# Patient Record
Sex: FEMALE | Race: WHITE | NOT HISPANIC OR LATINO | Employment: OTHER | URBAN - METROPOLITAN AREA
[De-identification: names, ages, dates, MRNs, and addresses within clinical notes are randomized per-mention and may not be internally consistent; named-entity substitution may affect disease eponyms.]

---

## 2021-04-28 ENCOUNTER — TRANSFERRED RECORDS (OUTPATIENT)
Dept: HEALTH INFORMATION MANAGEMENT | Facility: CLINIC | Age: 81
End: 2021-04-28

## 2021-04-29 ENCOUNTER — TRANSFERRED RECORDS (OUTPATIENT)
Dept: HEALTH INFORMATION MANAGEMENT | Facility: CLINIC | Age: 81
End: 2021-04-29

## 2021-05-05 ENCOUNTER — MEDICAL CORRESPONDENCE (OUTPATIENT)
Dept: HEALTH INFORMATION MANAGEMENT | Facility: CLINIC | Age: 81
End: 2021-05-05

## 2021-05-05 ENCOUNTER — TRANSFERRED RECORDS (OUTPATIENT)
Dept: HEALTH INFORMATION MANAGEMENT | Facility: CLINIC | Age: 81
End: 2021-05-05

## 2021-05-05 ENCOUNTER — TELEPHONE (OUTPATIENT)
Dept: ORTHOPEDICS | Facility: CLINIC | Age: 81
End: 2021-05-05

## 2021-05-05 NOTE — TELEPHONE ENCOUNTER
Health Call Center    Phone Message    May a detailed message be left on voicemail: yes     Reason for Call: Other: Dr Darien Alvarado office calling to refer patient Yuridia Galvan to Dr Schmitz. Mass Right Hand 4.1 cm RL/3.7cm CC/1.5 cm AD/ they will have [facility] push the CT scan and they will also fax over notes to the fax of [phone] Please call patient to set consult. [phone]      Action Taken: Message routed to:  [facility] ([facility] Ortho    Travel Screening: Not Applicable

## 2021-05-05 NOTE — TELEPHONE ENCOUNTER
LVM offering to schedule an appt with Dr Schmitz. Offered appt with Dr Chavez or Dr Casillas to get pt seen sooner. Encouraged pt to call back but writer will call pt again to get her scheduled.     Alpa Trujillo ATC

## 2021-05-05 NOTE — TELEPHONE ENCOUNTER
Called and spoke to pt. Scheduled pt for an in person appt. Pt declined a virtual visit at a sooner date. Pt requested an appointment closer to home and didn't want to come into [facility] for their appt.     Scheduled appt with Dr Chavez. Gave pt address into the [facility]. Pt confirmed appt date/time/location and gave parking options. All questions answered.     Alpa Trujillo ATC

## 2021-05-06 NOTE — TELEPHONE ENCOUNTER
RECORDS RECEIVED FROM: Right Hand Mass // referred by Darien Alvarado    DATE RECEIVED: May 7, 2021     NOTES STATUS DETAILS   OFFICE NOTE from referring provider Internal Darien Gooden DO   OFFICE NOTE from other specialist N/A    DISCHARGE SUMMARY from hospital N/A    DISCHARGE REPORT from the ER N/A    OPERATIVE REPORT N/A    MEDICATION LIST Internal    IMPLANT RECORD/STICKER N/A    LABS     CBC/DIFF N/A    CULTURES N/A    INJECTIONS DONE IN RADIOLOGY N/A    MRI N/A    CT SCAN Internal    XRAYS (IMAGES & REPORTS) N/A    TUMOR     PATHOLOGY  Slides & report N/A    05/06/21   9:08 AM   COMPLETE  Cecily Calixto, CMA

## 2021-05-07 ENCOUNTER — OFFICE VISIT (OUTPATIENT)
Dept: ORTHOPEDICS | Facility: CLINIC | Age: 81
End: 2021-05-07
Payer: COMMERCIAL

## 2021-05-07 ENCOUNTER — PRE VISIT (OUTPATIENT)
Dept: ORTHOPEDICS | Facility: CLINIC | Age: 81
End: 2021-05-07

## 2021-05-07 VITALS — WEIGHT: 165 LBS | HEIGHT: 67 IN | BODY MASS INDEX: 25.9 KG/M2

## 2021-05-07 DIAGNOSIS — M79.89 SOFT TISSUE MASS: Primary | ICD-10-CM

## 2021-05-07 PROCEDURE — 99204 OFFICE O/P NEW MOD 45 MIN: CPT | Mod: GC | Performed by: ORTHOPAEDIC SURGERY

## 2021-05-07 RX ORDER — BIMATOPROST 0.3 MG/ML
1 SOLUTION/ DROPS OPHTHALMIC AT BEDTIME
COMMUNITY
End: 2021-05-19

## 2021-05-07 RX ORDER — AMLODIPINE BESYLATE 5 MG/1
5 TABLET ORAL EVERY MORNING
COMMUNITY
Start: 2020-12-11

## 2021-05-07 RX ORDER — ATROPINE SULFATE 10 MG/ML
SOLUTION/ DROPS OPHTHALMIC
COMMUNITY
End: 2021-05-19

## 2021-05-07 RX ORDER — ACETAZOLAMIDE 500 MG/1
500 CAPSULE, EXTENDED RELEASE ORAL 2 TIMES DAILY
COMMUNITY

## 2021-05-07 RX ORDER — LOVASTATIN 20 MG
20 TABLET ORAL AT BEDTIME
COMMUNITY
Start: 2020-12-11

## 2021-05-07 RX ORDER — METOPROLOL SUCCINATE 50 MG/1
50 TABLET, EXTENDED RELEASE ORAL EVERY MORNING
COMMUNITY
Start: 2021-05-03

## 2021-05-07 RX ORDER — ACETAMINOPHEN 500 MG
1000 TABLET ORAL EVERY 6 HOURS PRN
COMMUNITY

## 2021-05-07 RX ORDER — LOSARTAN POTASSIUM 100 MG/1
100 TABLET ORAL EVERY MORNING
COMMUNITY
Start: 2020-12-11

## 2021-05-07 RX ORDER — LEVOTHYROXINE SODIUM 100 UG/1
100 TABLET ORAL EVERY MORNING
COMMUNITY
Start: 2020-12-12

## 2021-05-07 RX ORDER — POLYETHYLENE GLYCOL 3350
POWDER (GRAM) MISCELLANEOUS
COMMUNITY
End: 2021-05-19

## 2021-05-07 RX ORDER — AMOXICILLIN 250 MG
1 CAPSULE ORAL PRN
Status: ON HOLD | COMMUNITY
End: 2021-05-25

## 2021-05-07 RX ORDER — TIMOLOL MALEATE 5 MG/ML
1 SOLUTION/ DROPS OPHTHALMIC EVERY EVENING
COMMUNITY

## 2021-05-07 RX ORDER — AMOXICILLIN 500 MG/1
CAPSULE ORAL
COMMUNITY
End: 2021-05-19

## 2021-05-07 RX ORDER — DORZOLAMIDE HCL 20 MG/ML
1 SOLUTION/ DROPS OPHTHALMIC 2 TIMES DAILY
COMMUNITY

## 2021-05-07 RX ORDER — BIMATOPROST 0.3 MG/ML
SOLUTION/ DROPS OPHTHALMIC
COMMUNITY
End: 2021-05-19

## 2021-05-07 ASSESSMENT — MIFFLIN-ST. JEOR: SCORE: 1253.68

## 2021-05-07 NOTE — LETTER
Date:May 10, 2021      Patient was self referred, no letter generated. Do not send.        [facility] Health Information

## 2021-05-07 NOTE — LETTER
5/7/2021         RE: Yuridia Galvan  [id number] [facility] [address]        Dear Colleague,    Thank you for referring your patient, Yuridia Galvan, to the [facility] [place]. Please see a copy of my visit note below.    I was present with the resident during the history and exam.  I discussed the case with the resident and agree with the findings as documented in the assessment and plan.        [facility], Orthopaedic Oncology Surgery Consultation    Yuridia Galvan MRN# [id number]   Age: 80 year old YOB: 1940     Requesting physician: No ref. provider found  No primary care provider on file.            Assessment and Plan:   Assessment:  80-year-old right-hand-dominant female with chronic painful right hypothenar mass.  Differential includes hemangioma, neurofibroma or nerve sheath tumor.     Plan:  -Will attempt to obtain MRI to further evaluate the mass and its relationship to the adjacent neurovascular structures.  Patient has an implantable cardiac event monitor, however this appears to be MRI compatible based on the device information card.  -Discussed treatment options including continued observation versus mass excision.  Given the mass has been exquisitely painful and limiting the patient's ability to participate in activities, the patient is interested in mass excision.  Although it is likely the mass could be excised without neurovascular damage, discussed the risks of surgery which include but are not limited to infection, blood loss, neurovascular injury, temporary or permanent pain/paresthesias/numbness, and incomplete excision of the mass/recurrence.  After this discussion the patient elected to proceed with surgery.           History of Present Illness:   80 year old RHD female with a history of a right hypothenar mass since 1999.  She had a mass evaluated in 1999 but was told there were no surgical treatment options given the proximity of  "the mass to the neurovascular structures in her hand.  Over the years the mass has been stable to slowly enlarging however there was a noticeable increase in the size beginning approximately 1 year ago.  She also began experiencing severe hypothenar pain with the increase in size, however her pain has significantly improved in the past month.  Pain is not present at rest but does occur with direct pressure.  Pain has prevented her from participating in activities she wishes to do such as gardening.  She also endorses numbness and paresthesias over the palmar aspect of her index through small fingers, most notably in the small finger.  Endorses previous transient skin discoloration over the mass; previously a bluish hue, transiently erythematous.  She does report she was diagnosed with cellulitis overlying the mass in March 2021 that was treated with antibiotics and has since resolved.  Her  notes that the mass has intermittently changed in size and density.  No known injury or surgery to right hand.  Denies fevers, chills, nausea, vomiting, night sweats, or unintentional weight loss.    Previous work-up has included a CT scan.  She does have a implanted cardiac event monitor which was felt to be a contraindication to an MRI at an outside facility.           Physical Exam:     EXAMINATION pertinent findings:   PSYCH: Pleasant, healthy-appearing, alert, oriented x3, cooperative. Normal mood and affect.  VITAL SIGNS: Height 1.69 m (5' 6.54\"), weight 74.8 kg (165 lb)..  Reviewed nursing intake notes.   Body mass index is 26.21 kg/m .  RESP: non labored breathing   ABD: benign, soft, non-tender, no acute peritoneal findings  SKIN: grossly normal   LYMPHATIC: grossly normal, no adenopathy, no extremity edema  NEURO: grossly normal , no motor deficits  VASCULAR: satisfactory perfusion of all extremities   MUSCULOSKELETAL:   -Skin intact throughout.  No rashes or erythema. Visible swelling of the hypothenar eminence. "  No appreciable atrophy in the first webspace  -Palpable mass over the volar hypothenar eminence, approximately 5 cm in length and 4 cm in width.  Mildly tender to palpation about the mass  -Negative Tinel's over the carpal tunnel and Guyon's canal  -Full symmetric range of motion with the wrist, MCP, PIP, and DIP joints  -5/5 strength with wrist extension, wrist flexion, handgrip, finger extension, EPL, FPL, interossei  -Subjectively decreased but intact sensation in the low ulnar nerve distribution.  Sensation intact to light touch throughout the median, radial, and high ulnar nerve distribution  -2+ radial pulse.  All fingers are warm and well-perfused  -Jaswant's test is notable for brisk reperfusion with both the radial and ulnar arteries             Data:   All laboratory data reviewed  All imaging studies reviewed by me    CT scan from outside hospital 4/28/21        DATA for DOCUMENTATION:         Past Medical History:   There is no problem list on file for this patient.    No past medical history on file.    Also see scanned health assessment forms.       Past Surgical History:   No past surgical history on file.         Social History:     Social History     Socioeconomic History     Marital status:      Spouse name: Not on file     Number of children: Not on file     Years of education: Not on file     Highest education level: Not on file   Occupational History     Not on file   Social Needs     Financial resource strain: Not on file     Food insecurity     Worry: Not on file     Inability: Not on file     Transportation needs     Medical: Not on file     Non-medical: Not on file   Tobacco Use     Smoking status: Not on file   Substance and Sexual Activity     Alcohol use: Not on file     Drug use: Not on file     Sexual activity: Not on file   Lifestyle     Physical activity     Days per week: Not on file     Minutes per session: Not on file     Stress: Not on file   Relationships     Social  connections     Talks on phone: Not on file     Gets together: Not on file     Attends Anabaptist service: Not on file     Active member of club or organization: Not on file     Attends meetings of clubs or organizations: Not on file     Relationship status: Not on file     Intimate partner violence     Fear of current or ex partner: Not on file     Emotionally abused: Not on file     Physically abused: Not on file     Forced sexual activity: Not on file   Other Topics Concern     Not on file   Social History Narrative     Not on file            Family History:     No family history on file.         Medications:     Current Outpatient Medications   Medication Sig     acetaminophen (TYLENOL) 500 MG tablet Take 1,000 mg by mouth     acetaZOLAMIDE (DIAMOX SEQUEL) 500 MG 12 hr capsule acetazolamide  mg capsule,extended release     amLODIPine (NORVASC) 5 MG tablet amlodipine 5 mg tablet     amoxicillin (AMOXIL) 500 MG capsule amoxicillin 500 mg capsule     atropine 1 % ophthalmic solution atropine 1 % eye drops     bimatoprost (LUMIGAN) 0.03 % ophthalmic solution bimatoprost 0.03 % eye drops     bimatoprost (LUMIGAN) 0.03 % ophthalmic solution 1 drop At Bedtime     dorzolamide (TRUSOPT) 2 % ophthalmic solution dorzolamide 2 % eye drops     levothyroxine (SYNTHROID/LEVOTHROID) 100 MCG tablet levothyroxine 100 mcg tablet     losartan (COZAAR) 100 MG tablet losartan 100 mg tablet     lovastatin (MEVACOR) 20 MG tablet lovastatin 20 mg tablet     metoprolol succinate ER (TOPROL-XL) 50 MG 24 hr tablet metoprolol succinate ER 50 mg tablet,extended release 24 hr     polyethylene glycol 3350 POWD      senna-docusate (SENOKOT-S/PERICOLACE) 8.6-50 MG tablet Take 1 tablet by mouth daily     timolol maleate (TIMOPTIC) 0.5 % ophthalmic solution timolol maleate 0.5 % eye drops     No current facility-administered medications for this visit.               Review of Systems:   A comprehensive 10 point review of systems  (constitutional, ENT, cardiac, peripheral vascular, lymphatic, respiratory, GI, , Musculoskeletal, skin, Neurological) was performed and found to be negative except as described in this note.     See intake form completed by patient    The patient was seen and examined with Dr. Chavez.    Shekhar Max MD  PGY-4, Orthopaedic Surgery  Pager: [phone]        Again, thank you for allowing me to participate in the care of your patient.        Sincerely,        Braydon Chavez MD

## 2021-05-07 NOTE — PROGRESS NOTES
[facility], Orthopaedic Oncology Surgery Consultation    Yuridia Galvan MRN# [id number]   Age: 80 year old YOB: 1940     Requesting physician: No ref. provider found  No primary care provider on file.            Assessment and Plan:   Assessment:  80-year-old right-hand-dominant female with chronic painful right hypothenar mass.  Differential includes hemangioma, neurofibroma or nerve sheath tumor.     Plan:  -Will attempt to obtain MRI to further evaluate the mass and its relationship to the adjacent neurovascular structures.  Patient has an implantable cardiac event monitor, however this appears to be MRI compatible based on the device information card.  -Discussed treatment options including continued observation versus mass excision.  Given the mass has been exquisitely painful and limiting the patient's ability to participate in activities, the patient is interested in mass excision.  Although it is likely the mass could be excised without neurovascular damage, discussed the risks of surgery which include but are not limited to infection, blood loss, neurovascular injury, temporary or permanent pain/paresthesias/numbness, and incomplete excision of the mass/recurrence.  After this discussion the patient elected to proceed with surgery.           History of Present Illness:   80 year old RHD female with a history of a right hypothenar mass since 1999.  She had a mass evaluated in 1999 but was told there were no surgical treatment options given the proximity of the mass to the neurovascular structures in her hand.  Over the years the mass has been stable to slowly enlarging however there was a noticeable increase in the size beginning approximately 1 year ago.  She also began experiencing severe hypothenar pain with the increase in size, however her pain has significantly improved in the past month.  Pain is not present at rest but does occur with direct pressure.  Pain has prevented her  "from participating in activities she wishes to do such as gardening.  She also endorses numbness and paresthesias over the palmar aspect of her index through small fingers, most notably in the small finger.  Endorses previous transient skin discoloration over the mass; previously a bluish hue, transiently erythematous.  She does report she was diagnosed with cellulitis overlying the mass in March 2021 that was treated with antibiotics and has since resolved.  Her  notes that the mass has intermittently changed in size and density.  No known injury or surgery to right hand.  Denies fevers, chills, nausea, vomiting, night sweats, or unintentional weight loss.    Previous work-up has included a CT scan.  She does have a implanted cardiac event monitor which was felt to be a contraindication to an MRI at an outside facility.           Physical Exam:     EXAMINATION pertinent findings:   PSYCH: Pleasant, healthy-appearing, alert, oriented x3, cooperative. Normal mood and affect.  VITAL SIGNS: Height 1.69 m (5' 6.54\"), weight 74.8 kg (165 lb)..  Reviewed nursing intake notes.   Body mass index is 26.21 kg/m .  RESP: non labored breathing   ABD: benign, soft, non-tender, no acute peritoneal findings  SKIN: grossly normal   LYMPHATIC: grossly normal, no adenopathy, no extremity edema  NEURO: grossly normal , no motor deficits  VASCULAR: satisfactory perfusion of all extremities   MUSCULOSKELETAL:   -Skin intact throughout.  No rashes or erythema. Visible swelling of the hypothenar eminence.  No appreciable atrophy in the first webspace  -Palpable mass over the volar hypothenar eminence, approximately 5 cm in length and 4 cm in width.  Mildly tender to palpation about the mass  -Negative Tinel's over the carpal tunnel and Guyon's canal  -Full symmetric range of motion with the wrist, MCP, PIP, and DIP joints  -5/5 strength with wrist extension, wrist flexion, handgrip, finger extension, EPL, FPL, " interossei  -Subjectively decreased but intact sensation in the low ulnar nerve distribution.  Sensation intact to light touch throughout the median, radial, and high ulnar nerve distribution  -2+ radial pulse.  All fingers are warm and well-perfused  -Jaswant's test is notable for brisk reperfusion with both the radial and ulnar arteries             Data:   All laboratory data reviewed  All imaging studies reviewed by me    CT scan from outside hospital 4/28/21        DATA for DOCUMENTATION:         Past Medical History:   There is no problem list on file for this patient.    No past medical history on file.    Also see scanned health assessment forms.       Past Surgical History:   No past surgical history on file.         Social History:     Social History     Socioeconomic History     Marital status:      Spouse name: Not on file     Number of children: Not on file     Years of education: Not on file     Highest education level: Not on file   Occupational History     Not on file   Social Needs     Financial resource strain: Not on file     Food insecurity     Worry: Not on file     Inability: Not on file     Transportation needs     Medical: Not on file     Non-medical: Not on file   Tobacco Use     Smoking status: Not on file   Substance and Sexual Activity     Alcohol use: Not on file     Drug use: Not on file     Sexual activity: Not on file   Lifestyle     Physical activity     Days per week: Not on file     Minutes per session: Not on file     Stress: Not on file   Relationships     Social connections     Talks on phone: Not on file     Gets together: Not on file     Attends Quaker service: Not on file     Active member of club or organization: Not on file     Attends meetings of clubs or organizations: Not on file     Relationship status: Not on file     Intimate partner violence     Fear of current or ex partner: Not on file     Emotionally abused: Not on file     Physically abused: Not on file      Forced sexual activity: Not on file   Other Topics Concern     Not on file   Social History Narrative     Not on file            Family History:     No family history on file.         Medications:     Current Outpatient Medications   Medication Sig     acetaminophen (TYLENOL) 500 MG tablet Take 1,000 mg by mouth     acetaZOLAMIDE (DIAMOX SEQUEL) 500 MG 12 hr capsule acetazolamide  mg capsule,extended release     amLODIPine (NORVASC) 5 MG tablet amlodipine 5 mg tablet     amoxicillin (AMOXIL) 500 MG capsule amoxicillin 500 mg capsule     atropine 1 % ophthalmic solution atropine 1 % eye drops     bimatoprost (LUMIGAN) 0.03 % ophthalmic solution bimatoprost 0.03 % eye drops     bimatoprost (LUMIGAN) 0.03 % ophthalmic solution 1 drop At Bedtime     dorzolamide (TRUSOPT) 2 % ophthalmic solution dorzolamide 2 % eye drops     levothyroxine (SYNTHROID/LEVOTHROID) 100 MCG tablet levothyroxine 100 mcg tablet     losartan (COZAAR) 100 MG tablet losartan 100 mg tablet     lovastatin (MEVACOR) 20 MG tablet lovastatin 20 mg tablet     metoprolol succinate ER (TOPROL-XL) 50 MG 24 hr tablet metoprolol succinate ER 50 mg tablet,extended release 24 hr     polyethylene glycol 3350 POWD      senna-docusate (SENOKOT-S/PERICOLACE) 8.6-50 MG tablet Take 1 tablet by mouth daily     timolol maleate (TIMOPTIC) 0.5 % ophthalmic solution timolol maleate 0.5 % eye drops     No current facility-administered medications for this visit.               Review of Systems:   A comprehensive 10 point review of systems (constitutional, ENT, cardiac, peripheral vascular, lymphatic, respiratory, GI, , Musculoskeletal, skin, Neurological) was performed and found to be negative except as described in this note.     See intake form completed by patient    The patient was seen and examined with Dr. Chavez.    Shekhar Max MD  PGY-4, Orthopaedic Surgery  Pager: [phone]

## 2021-05-07 NOTE — NURSING NOTE
"Reason For Visit:   Chief Complaint   Patient presents with     Consult     consulting right hand mass referred by Dr. Darien Gooden // noticed cyst back in 2000 // had covid vaccination in March 2021 and cyst got bigger per pt        Ht 1.69 m (5' 6.54\")   Wt 74.8 kg (165 lb)   BMI 26.21 kg/m      Pain Assessment  Patient Currently in Pain: Yes  0-10 Pain Scale: 2  Primary Pain Location: Hand(right hand)  Pain Descriptors: Numbness, Tingling    Leung Sarah, ATC    "

## 2021-05-12 ENCOUNTER — TELEPHONE (OUTPATIENT)
Dept: ORTHOPEDICS | Facility: CLINIC | Age: 81
End: 2021-05-12

## 2021-05-12 NOTE — TELEPHONE ENCOUNTER
RN called and spoke with Yuridia.  Her MRI is scheduled for  05-17-21.  Her [employer] device information is in her chart.

## 2021-05-14 ENCOUNTER — TELEPHONE (OUTPATIENT)
Dept: ORTHOPEDICS | Facility: CLINIC | Age: 81
End: 2021-05-14

## 2021-05-14 DIAGNOSIS — Z11.59 ENCOUNTER FOR SCREENING FOR OTHER VIRAL DISEASES: ICD-10-CM

## 2021-05-14 PROBLEM — M79.89 SOFT TISSUE MASS: Status: ACTIVE | Noted: 2021-05-14

## 2021-05-14 NOTE — TELEPHONE ENCOUNTER
RN called and left a voice message with Yuridia.  RN reviewed surgery packet information.  Her  will take care of her after surgery based on a conversation previously.  RN also mailed surgery packet to her and reviewed information below.      Patient is scheduled for surgery with Dr. Chavez     Spoke with: MINNIE Levin - RN scheduled with patient     Date of Surgery: 5/25/2021     Location: [facility]     Informed patient they will need an adult  yes     Pre op with Provider n/a     H&P: Scheduled with PAC on 5/19/2021 (video visit)     Pre-procedure COVID-19 Test: [facility] on 5/21/2021      Additional imaging/appointments: n/a     Surgery packet: Mailed by RN       Additional comments: n/a

## 2021-05-14 NOTE — TELEPHONE ENCOUNTER
Patient is scheduled for surgery with Dr. Chavez    Spoke with: Poonam RN - RN scheduled with patient    Date of Surgery: 5/25/2021    Location: [facility]    Informed patient they will need an adult  yes    Pre op with Provider n/a    H&P: Scheduled with PAC on 5/19/2021 (video visit)    Pre-procedure COVID-19 Test: [facility] on 5/21/2021     Additional imaging/appointments: n/a    Surgery packet: Mailed by RN      Additional comments: n/a

## 2021-05-17 ENCOUNTER — ANCILLARY PROCEDURE (OUTPATIENT)
Dept: MRI IMAGING | Facility: CLINIC | Age: 81
End: 2021-05-17
Attending: ORTHOPAEDIC SURGERY
Payer: COMMERCIAL

## 2021-05-17 DIAGNOSIS — M79.89 SOFT TISSUE MASS: ICD-10-CM

## 2021-05-17 PROCEDURE — A9585 GADOBUTROL INJECTION: HCPCS | Performed by: RADIOLOGY

## 2021-05-17 PROCEDURE — 73220 MRI UPPR EXTREMITY W/O&W/DYE: CPT | Mod: RT | Performed by: RADIOLOGY

## 2021-05-17 RX ORDER — GADOBUTROL 604.72 MG/ML
7.5 INJECTION INTRAVENOUS ONCE
Status: COMPLETED | OUTPATIENT
Start: 2021-05-17 | End: 2021-05-17

## 2021-05-17 RX ADMIN — GADOBUTROL 7.5 ML: 604.72 INJECTION INTRAVENOUS at 14:17

## 2021-05-17 NOTE — TELEPHONE ENCOUNTER
FUTURE VISIT INFORMATION      SURGERY INFORMATION:    Date: 21    Location: [facility] OR    Surgeon:  Braydon Chavez MD    Anesthesia Type:  general    Procedure: Excision right hand mass    Consult: ov     RECORDS REQUESTED FROM:       Primary Care Provider: Clara Humphries NP  - [facility]    Most recent EKG+ Tracin18- [facility]    Most recent ECHO: 18- [facility]    Most recent Cardiac Stress Test: 11/10/11- [facility]

## 2021-05-19 ENCOUNTER — VIRTUAL VISIT (OUTPATIENT)
Dept: SURGERY | Facility: CLINIC | Age: 81
End: 2021-05-19
Payer: COMMERCIAL

## 2021-05-19 ENCOUNTER — PRE VISIT (OUTPATIENT)
Dept: SURGERY | Facility: CLINIC | Age: 81
End: 2021-05-19

## 2021-05-19 ENCOUNTER — ANESTHESIA EVENT (OUTPATIENT)
Dept: SURGERY | Facility: CLINIC | Age: 81
End: 2021-05-19

## 2021-05-19 DIAGNOSIS — Z01.818 PRE-OP EXAMINATION: Primary | ICD-10-CM

## 2021-05-19 PROCEDURE — 99204 OFFICE O/P NEW MOD 45 MIN: CPT | Mod: 95 | Performed by: NURSE PRACTITIONER

## 2021-05-19 RX ORDER — DIPHENHYDRAMINE HCL 25 MG
25 TABLET ORAL EVERY 6 HOURS PRN
COMMUNITY

## 2021-05-19 RX ORDER — LATANOPROSTENE BUNOD 0.24 MG/ML
1 SOLUTION/ DROPS OPHTHALMIC AT BEDTIME
COMMUNITY

## 2021-05-19 ASSESSMENT — LIFESTYLE VARIABLES: TOBACCO_USE: 0

## 2021-05-19 ASSESSMENT — PAIN SCALES - GENERAL: PAINLEVEL: NO PAIN (0)

## 2021-05-19 ASSESSMENT — ENCOUNTER SYMPTOMS: DYSRHYTHMIAS: 1

## 2021-05-19 NOTE — H&P (VIEW-ONLY)
Pre-Operative H & P     CC:  Preoperative exam to assess for increased cardiopulmonary risk while undergoing surgery and anesthesia.    Date of Encounter: 2021  Primary Care Physician:  No primary care provider on file.    Type of service:  Video Visit    Patient verbally consented to video service today: YES    Two identifiers used:  yes (name and )    Video Start Time: 9:30  Video End Time (time video stopped): 9:55    Originating Location (pt. Location): Home    Distant Location (provider location):  home    Mode of Communication:  Video Conference via [employer]    Please note, because this was a virtual visit, a full physical could not be completed.  On the DOS, the OOD of anesthesia will complete the appropriate components of the physical exam.  --      HPI  Yuridia Galvan is a 80 year old female who presents for pre-operative H & P in preparation for  Excision right hand mass - Right with Braydon Chavez MD on 2021  at [facility] under general anesthesia.    Ms. Galvan was seen in Orthopaedic Oncology Surgery Consultation on 21 with Dr. Chavez for evaluation of a right hypothenar mass since . She previously had it evaluated and told there was no surgical treatment options given the proximity of the mass to the neurovascular structures in her hand.  Over the last year, there has been a noticeable increase in it's size.  She also has noticed severe hypothenar pain with the increase in size.  This has subsided more recently and will only have pain if direct pressure is applied.    She also endorses numbness and paresthesias over the palmar aspect of her index through small fingers, most notably in the small finger. She was treated with antibiotics this past March for cellulitis of this area now resolved. Through Dr. 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  He counseled her on treatment options.  She has opted to proceed with  above surgical intervention.     Of significance, Ms. Galvan is followed by cardiology at [facility] [place] with last visit on 5/3/2021 in annual follow-up for h/o two syncopal episodes 7532-1836.  Her first syncopal episode is thought to be due to medication (summer 2017 or 2018?).  Her second syncopal episode occurred POD#1 from glaucoma surgery (11/2018) resulting in cervical neck fracture s/p fusion.  Evaluation included 2 week Holter 12/2018 which was unremarkable.  She had an echocardiogram without significant findings.  She had a loop recorder implanted 10/28/19. Records indicate this showed 4 secondary to pause recorded 2/3/20 but not true pause (undersensed QRS).  She has had brief atrial fibrillation x 2 documented episodes.  She does endorse a h/o  atrial fibrillation associated with thyroid surgery 16 years ago.  Her [id number] = 4.  Per cardiology's note, Ms. Galvan is not  not on anticoagulation due to the history of falls.   Records indicate that her syncope is suspected to be vasovagal due to right hand pain from subcutaneous mass. She has poor balance along with poor vision.  She was  advised to purchase walker for safety and increase activity.       PAC referral for risk assessment and optimization of anesthesia with comorbid conditions of H/o syncope,hypertension, HLD, hypothyroidism, chronic hyponatremia due to SIADH, previous blood transfusion, glaucoma with poor vision and frequent falls (mechanical) due to poor vision.       Dx:  soft tissue mass     History is obtained from the patient and electronic health record.     Past Medical History  Past Medical History:   Diagnosis Date     Arthritis      Closed fracture of odontoid process of axis (H) 2018     History of blood transfusion      Hypertension      Soft tissue mass     right hand     Thyroid disease      Vasovagal syncope        Past Surgical History  Past Surgical History:   Procedure Laterality Date     BACK  SURGERY  2018    C1-C2 fusion     CERVICAL FUSION  2018    C1-C2     ELECTRONIC ANALYSIS, IMPLANTABLE LOOP RECORDER (ILR)       ENT SURGERY      T&A age 10      GYN SURGERY  1988    ADILENE and BSO     ORTHOPEDIC SURGERY      right TKA 2012     THYROID LOBECTOMY Left 2008       Hx of Blood transfusions/reactions: yes without reaction     Hx of abnormal bleeding or anti-platelet use: denies    Menstrual history: No LMP recorded. Patient is postmenopausal.:    Steroid use in the last year: denies    Personal or FH with difficulty with Anesthesia:  denies    Prior to Admission Medications  Current Outpatient Medications   Medication Sig Dispense Refill     acetaminophen (TYLENOL) 500 MG tablet Take 1,000 mg by mouth every 6 hours as needed for mild pain        acetaZOLAMIDE (DIAMOX SEQUEL) 500 MG 12 hr capsule Take 500 mg by mouth 2 times daily        amLODIPine (NORVASC) 5 MG tablet Take 5 mg by mouth every morning        diphenhydrAMINE (BENADRYL) 25 MG tablet Take 25 mg by mouth every 6 hours as needed for itching or allergies       dorzolamide (TRUSOPT) 2 % ophthalmic solution Place 1 drop into both eyes 2 times daily        latanoprostene bunod (VYZULTA) 0.024 % SOLN ophthalmic solution Place 1 drop into both eyes At Bedtime       levothyroxine (SYNTHROID/LEVOTHROID) 100 MCG tablet Take 100 mcg by mouth every morning        losartan (COZAAR) 100 MG tablet Take 100 mg by mouth every morning        lovastatin (MEVACOR) 20 MG tablet Take 20 mg by mouth At Bedtime        metoprolol succinate ER (TOPROL-XL) 50 MG 24 hr tablet Take 50 mg by mouth every morning        netarsudil (RHOPRESSA) 0.02 % ophthalmic solution Place 1 drop into both eyes At Bedtime       senna-docusate (SENOKOT-S/PERICOLACE) 8.6-50 MG tablet Take 1 tablet by mouth as needed        timolol maleate (TIMOPTIC) 0.5 % ophthalmic solution Place 1 drop into both eyes every evening          Allergies  Allergies   Allergen Reactions     Seasonal Allergies         Social History  Social History     Socioeconomic History     Marital status:      Spouse name: Not on file     Number of children: Not on file     Years of education: Not on file     Highest education level: Not on file   Occupational History     Not on file   Social Needs     Financial resource strain: Not on file     Food insecurity     Worry: Not on file     Inability: Not on file     Transportation needs     Medical: Not on file     Non-medical: Not on file   Tobacco Use     Smoking status: Never Smoker     Smokeless tobacco: Never Used   Substance and Sexual Activity     Alcohol use: Yes     Comment: wine daily - 1 glass before supper     Drug use: Never     Sexual activity: Not on file   Lifestyle     Physical activity     Days per week: Not on file     Minutes per session: Not on file     Stress: Not on file   Relationships     Social connections     Talks on phone: Not on file     Gets together: Not on file     Attends Hinduism service: Not on file     Active member of club or organization: Not on file     Attends meetings of clubs or organizations: Not on file     Relationship status: Not on file     Intimate partner violence     Fear of current or ex partner: Not on file     Emotionally abused: Not on file     Physically abused: Not on file     Forced sexual activity: Not on file   Other Topics Concern     Not on file   Social History Narrative     Not on file       Family History  Family History   Problem Relation Age of Onset     Hypertension Mother      Hypertension Father      Myocardial Infarction Father      Hypertension Brother      Cerebrovascular Disease Brother      Sarcoidosis Daughter            ROS/MED HX  ENT/Pulmonary: Comment: Glaucoma s/p b/l shunt (2014)    (+) MOSHE risk factors, hypertension,  (-) tobacco use and asthma   Neurologic:  - neg neurologic ROS     Cardiovascular: Comment: Denies cardiac symptoms including chest pain, SOB, palpitations,  HUERTA, orthopnea, or PND.       (+) Dyslipidemia hypertension-----fainting (syncope). dysrhythmias (Paroxysmal atrial fibrillation), a-fib, Previous cardiac testing  (-) taking anticoagulants/antiplatelets   METS/Exercise Tolerance: 3 - Able to walk 1-2 blocks without stopping Comment: /reports doing housework including vacuuming.  She goes up and down stairs multiple times daily as has a split level and need to to use stairs to go to the bedroom and family room.    Hematologic:     (+) history of blood transfusion (2012), no previous transfusion reaction, Known PRBC Anitbodies:No  (-) history of blood clots   Musculoskeletal: Comment: H/o closed fracture of odontoid process of axis s/p cervical fusion C1-C2 (2018).  (+) arthritis (right TKA 2012),     GI/Hepatic:     (+) appendicitis (s/p appendectomy 1988. ),     Renal/Genitourinary: Comment: ADILENE and BSO 1988 - neg Renal ROS     Endo:     (+) thyroid problem, hypothyroidism,     Psychiatric/Substance Use:  - neg psychiatric ROS     Infectious Disease: Comment: Completed COVID  Vaccine March 2021.      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               No vital signs taken since this is a virtual visit.       Physical Exam  Constitutional: Awake, alert, cooperative, no apparent distress, and appears stated age.  HENT: Normocephalic   Respiratory: Regular respiratory rate.  Effort is non-labored, quiet, easy breathing.   Musculoskeletal:  Full ROM of neck.   Neurologic: Awake, alert, oriented to name, place and time.   Neuropsychiatric: Calm, cooperative. Normal affect.     **Please note, because this was a virtual visit due to COVID -19 pandemic, a full physical could not be completed.  On the DOS, the OOD of anesthesia will complete the appropriate components of the physical exam. Please refer to the physical examination documented by the anesthesiologist in the anesthesia record on the day of surgery. **      Labs: (personally reviewed)  Care  Everywhere 2021  Na+ 135  K+ 4.0  Cr 0.71      WBC 7.7  Hgb 11.8  Plts 248     PROCEDURES  [facility] interrogation 2021. No arrhythmias were detected     Cardiac Event Monitor Report     Patient: Yuridia Galvan    : 1940  Indication: syncope & collapse  Ordered by: Chris Arauz MD   Dates Recorded: 19-19    1.  The patient was monitored for 24 hours.  2.  Predominant rhythm was sinus rhythm.  3.  Rare PACs and PVCs were observed.  4.  Average heart rate during the monitoring period was 74 beats per minute with range of 54 to 120 beats per minute.  5.  Rare PACs and PVCs were observed.  6.  One symptomatic event was received.  This correlated with sinus rhythm during light activity.  7.  No sustained dysrhythmias were observed.    Echocardiogram 2018  Final Conclusion   Normal left ventricular size. Normal left ventricular systolic function.   Mild left ventricular hypertrophy. Sigmoid shaped interventricular septum.   No obvious regional wall motion abnormalities.   The ejection fraction is visually estimated at 55-60%.   The right ventricle is normal in size and function.   Left atrial size at the upper limits of normal.   No significant valvular heart disease noted.   Cannot estimate PA pressures on this study.   Technically difficult study - contrast was used to enhance endocardial definition due to   suboptimal image quality.   Estimated EF: 55-60%     NM MPI    FINAL CONCLUSIONS   NORMAL pharmacologic stress perfusion imaging study.   No significant symptoms with pharmacologic stress.   There was no ECG evidence diagnostic for of ischemia.   Myocardial perfusion imaging was normal.   LV systolic function was normal without regional wall motion   abnormalities.   Compared to prior study of 10/09, there is no significant change in major   findings.    Outside records reviewed from: Care Everywhere  LABS/DIAGNOSTIC STUDIES:   All labs and imaging personally reviewed      ASSESSMENT and PLAN  Yuridia Galvan is a 80 year old female scheduled to undergo  Excision right hand mass - Right with Braydon Chavez MD on 5/25/2021  at [facility] under general anesthesia    She has the following specific operative considerations:   - MOSHE # of risks 2/8 = low  - VTE risk:  0.5%  - Risk of PONV score = 2.  If > 2, anti-emetic intervention recommended.      #  Cardiology   - Denies known coronary artery disease.  Denies cardiac symptoms. METS:  3. LVEF 55-60%, mild LVH, and no significant valve disease.  RCRI : No serious cardiac risks.  0.4 % risk of major adverse cardiac event.       - Followed by [facility] cardiology with last visit on 5/3/2021 (reviewed note in CE). H/O syncopal episode X2 with cardiac evaluation including Holter, echo and loop recorder implanted.  Through the evaluation, syncope is suspected to be vasovagal.   - Remote h/o of atrial fibrillation post thyroid surgery 16 years ago.  She had a loop recorder implanted in 2019 with brief atrial fibrillation x 2 documented episodes. Her most recent interrogation form April 2021 shows no arrhthymias.  Per cardiology's note, her [id number] = 4 and she is not on anticoagulation due to the history of falls. Take beta blocker as prescribed DOS.   - HTN on triple therapy.  Take amlodipine and metoprolol DOS. Hold losartan DOS.   - HLD, take statin as prescribed DOS.     #  Pulmonary   - no smoking  - Denies pulmonary symptoms  - No inhalers     #  Hematology   -  H/o blood transfusion post TKA in 2014.  Denies reaction.      #  Endocrine   -  Hypothyroidism, take Levothyroxine DOS.    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 check electrolytes DOS.       #  Musculoskeletal   - soft tissue mass with above procedure planned.   - osteoarthritis s/p right TKA 2014.  Consideration for careful positioning   - s/p cervical fusion C1-C2       #  HEENT    - Glaucoma s/p b/l shunt (2014).  Very poor vision.  Frequent falls related to this.  Was instructed to purchase a walker per cardiology for safety reasons and increase activity.  Patient has not done this as of yet. Recommend fall precautions.     #  ID  - COVID-19 testing per surgeon's office  - Completed covid vaccinations    #   Anesthesia considerations   -  Refer to PAC assessment in anesthesia records    # Difficult stick       Arrival time, NPO, shower and medication instructions provided by nursing staff today.    Patient discussed with Dr. Parekh.  Patient is an acceptable candidate for the proposed procedure.  No further diagnostic evaluation is needed.     RIOS Magallanes CNP  Preoperative Assessment Center  [facility]  Phone: [phone]  Fax: [phone]

## 2021-05-19 NOTE — PATIENT INSTRUCTIONS
Preparing for Your Surgery      Name:  Yuridia Galvan   MRN:  [id number]   :  1940   Today's Date:  2021       Arriving for surgery:  Surgery date:  21  Arrival time: 12:30 pm    Restrictions due to COVID 19:  One consistent visitor per patient is allowed.  The visitor will be allowed in the pre-op area.  Visitors are asked to leave the building during the surgery.  No ill visitors.  All visitors must wear face mask.    [employer] parking is available for anyone with mobility limitations or disabilities.  ([employer]  24 hours/ 7 days a week; [employer]  7 am- 3:30 pm, Mon- Fri)    Please come to:     [facility] [address]  [address] [place], [place] [address]. [place]  [address]    -come in the front of [facility]. Park your car in the Green Lot.    -Proceed to the 3rd floor, check in at the Adult Surgery Waiting Lounge. [phone]    If an escort is needed stop at the Information Desk in the lobby. Inform the information person that you are here for surgery. An escort to the Adult Surgery Waiting Lounge will be provided.        What can I eat or drink?  -  You may eat and drink normally for up to 8 hours before your surgery.   -  You may have clear liquids until 2 hours before surgery.     Examples of clear liquids:  Water  Clear broth  Juices (apple, white grape, white cranberry  and cider) without pulp  Noncarbonated, powder based beverages  (lemonade and Alfrdeo-Aid)  Sodas (Sprite, 7-Up, ginger ale and seltzer)  Coffee or tea (without milk or cream)  Gatorade    -  No Alcohol for at least 24 hours before surgery     Which medicines can I take?    Hold Aspirin for 7 days before surgery.   Hold Multivitamins for 7 days before surgery.  Hold Supplements for 7 days before surgery.  Hold Ibuprofen (Advil, Motrin) for 1 day before surgery--unless otherwise directed by surgeon.  Hold Naproxen (Aleve) for 4 days before surgery.    -  DO NOT take these  medications the day of surgery:  Losartan.    -  PLEASE TAKE these medications the day of surgery:  Tylenol if needed; take other morning medications.    How do I prepare myself?  - Please take 2 showers before surgery using Scrubcare or Hibiclens soap.    Use this soap only from the neck to your toes.     Leave the soap on your skin for one minute--then rinse thoroughly.      You may use your own shampoo and conditioner; no other hair products.   - Please remove all jewelry and body piercings.  - No lotions, deodorants or fragrance.  - No makeup or fingernail polish.   - Bring your ID and insurance card.    - All patients are required to have a Covid-19 test within 4 days of surgery/procedure.      -Patients will be contacted by the [facility] scheduling team within 1 week of surgery to make an appointment.      - Patients may call the Scheduling team at [phone] if they have not been scheduled within 4 days of  surgery.      ALL PATIENTS GOING HOME THE SAME DAY OF SURGERY ARE REQUIRED TO HAVE A RESPONSIBLE ADULT TO DRIVE AND BE IN ATTENDANCE WITH THEM FOR 24 HOURS FOLLOWING SURGERY.    IF THE RESPONSIBLE ADULT IS REQUIRED FOR POST OP TEACHING THE POST OP RN WILL ASK THEM TO COME BACK TO THE RECOVERY AREA.    Questions or Concerns:    - For any questions regarding the day of surgery or your hospital stay, please contact the Pre Admission Nursing Office at [phone].       - If you have health changes between today and your surgery please call your surgeon.       For questions after surgery please call your surgeons office.

## 2021-05-19 NOTE — H&P
Pre-Operative H & P     CC:  Preoperative exam to assess for increased cardiopulmonary risk while undergoing surgery and anesthesia.    Date of Encounter: 2021  Primary Care Physician:  No primary care provider on file.    Type of service:  Video Visit    Patient verbally consented to video service today: YES    Two identifiers used:  yes (name and )    Video Start Time: 9:30  Video End Time (time video stopped): 9:55    Originating Location (pt. Location): Home    Distant Location (provider location):  home    Mode of Communication:  Video Conference via [employer]    Please note, because this was a virtual visit, a full physical could not be completed.  On the DOS, the OOD of anesthesia will complete the appropriate components of the physical exam.  --      HPI  Yuridia Galvan is a 80 year old female who presents for pre-operative H & P in preparation for  Excision right hand mass - Right with Braydon Chavez MD on 2021  at [facility] under general anesthesia.    Ms. Galvan was seen in Orthopaedic Oncology Surgery Consultation on 21 with Dr. Chavez for evaluation of a right hypothenar mass since . She previously had it evaluated and told there was no surgical treatment options given the proximity of the mass to the neurovascular structures in her hand.  Over the last year, there has been a noticeable increase in it's size.  She also has noticed severe hypothenar pain with the increase in size.  This has subsided more recently and will only have pain if direct pressure is applied.    She also endorses numbness and paresthesias over the palmar aspect of her index through small fingers, most notably in the small finger. She was treated with antibiotics this past March for cellulitis of this area now resolved. Through Dr. 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  He counseled her on treatment options.  She has opted to proceed with  above surgical intervention.     Of significance, Ms. Galvan is followed by cardiology at [facility] with last visit on 5/3/2021 in annual follow-up for h/o two syncopal episodes 6075-3140.  Her first syncopal episode is thought to be due to medication (summer 2017 or 2018?).  Her second syncopal episode occurred POD#1 from glaucoma surgery (11/2018) resulting in cervical neck fracture s/p fusion.  Evaluation included 2 week Holter 12/2018 which was unremarkable.  She had an echocardiogram without significant findings.  She had a loop recorder implanted 10/28/19. Records indicate this showed 4 secondary to pause recorded 2/3/20 but not true pause (undersensed QRS).  She has had brief atrial fibrillation x 2 documented episodes.  She does endorse a h/o  atrial fibrillation associated with thyroid surgery 16 years ago.  Her [id number] = 4.  Per cardiology's note, Ms. Galvan is not  not on anticoagulation due to the history of falls.   Records indicate that her syncope is suspected to be vasovagal due to right hand pain from subcutaneous mass. She has poor balance along with poor vision.  She was  advised to purchase walker for safety and increase activity.       PAC referral for risk assessment and optimization of anesthesia with comorbid conditions of H/o syncope,hypertension, HLD, hypothyroidism, chronic hyponatremia due to SIADH, previous blood transfusion, glaucoma with poor vision and frequent falls (mechanical) due to poor vision.       Dx:  soft tissue mass     History is obtained from the patient and electronic health record.     Past Medical History  Past Medical History:   Diagnosis Date     Arthritis      Closed fracture of odontoid process of axis (H) 2018     History of blood transfusion      Hypertension      Soft tissue mass     right hand     Thyroid disease      Vasovagal syncope        Past Surgical History  Past Surgical History:   Procedure Laterality Date     BACK  SURGERY  2018    C1-C2 fusion     CERVICAL FUSION  2018    C1-C2     ELECTRONIC ANALYSIS, IMPLANTABLE LOOP RECORDER (ILR)       ENT SURGERY      T&A age 10      GYN SURGERY  1988    ADILENE and BSO     ORTHOPEDIC SURGERY      right TKA 2012     THYROID LOBECTOMY Left 2008       Hx of Blood transfusions/reactions: yes without reaction     Hx of abnormal bleeding or anti-platelet use: denies    Menstrual history: No LMP recorded. Patient is postmenopausal.:    Steroid use in the last year: denies    Personal or FH with difficulty with Anesthesia:  denies    Prior to Admission Medications  Current Outpatient Medications   Medication Sig Dispense Refill     acetaminophen (TYLENOL) 500 MG tablet Take 1,000 mg by mouth every 6 hours as needed for mild pain        acetaZOLAMIDE (DIAMOX SEQUEL) 500 MG 12 hr capsule Take 500 mg by mouth 2 times daily        amLODIPine (NORVASC) 5 MG tablet Take 5 mg by mouth every morning        diphenhydrAMINE (BENADRYL) 25 MG tablet Take 25 mg by mouth every 6 hours as needed for itching or allergies       dorzolamide (TRUSOPT) 2 % ophthalmic solution Place 1 drop into both eyes 2 times daily        latanoprostene bunod (VYZULTA) 0.024 % SOLN ophthalmic solution Place 1 drop into both eyes At Bedtime       levothyroxine (SYNTHROID/LEVOTHROID) 100 MCG tablet Take 100 mcg by mouth every morning        losartan (COZAAR) 100 MG tablet Take 100 mg by mouth every morning        lovastatin (MEVACOR) 20 MG tablet Take 20 mg by mouth At Bedtime        metoprolol succinate ER (TOPROL-XL) 50 MG 24 hr tablet Take 50 mg by mouth every morning        netarsudil (RHOPRESSA) 0.02 % ophthalmic solution Place 1 drop into both eyes At Bedtime       senna-docusate (SENOKOT-S/PERICOLACE) 8.6-50 MG tablet Take 1 tablet by mouth as needed        timolol maleate (TIMOPTIC) 0.5 % ophthalmic solution Place 1 drop into both eyes every evening          Allergies  Allergies   Allergen Reactions     Seasonal Allergies         Social History  Social History     Socioeconomic History     Marital status:      Spouse name: Not on file     Number of children: Not on file     Years of education: Not on file     Highest education level: Not on file   Occupational History     Not on file   Social Needs     Financial resource strain: Not on file     Food insecurity     Worry: Not on file     Inability: Not on file     Transportation needs     Medical: Not on file     Non-medical: Not on file   Tobacco Use     Smoking status: Never Smoker     Smokeless tobacco: Never Used   Substance and Sexual Activity     Alcohol use: Yes     Comment: wine daily - 1 glass before supper     Drug use: Never     Sexual activity: Not on file   Lifestyle     Physical activity     Days per week: Not on file     Minutes per session: Not on file     Stress: Not on file   Relationships     Social connections     Talks on phone: Not on file     Gets together: Not on file     Attends [employer] service: Not on file     Active member of club or organization: Not on file     Attends meetings of clubs or organizations: Not on file     Relationship status: Not on file     Intimate partner violence     Fear of current or ex partner: Not on file     Emotionally abused: Not on file     Physically abused: Not on file     Forced sexual activity: Not on file   Other Topics Concern     Not on file   Social History Narrative     Not on file       Family History  Family History   Problem Relation Age of Onset     Hypertension Mother      Hypertension Father      Myocardial Infarction Father      Hypertension Brother      Cerebrovascular Disease Brother      Sarcoidosis Daughter            ROS/MED HX  ENT/Pulmonary: Comment: Glaucoma s/p b/l shunt (2014)    (+) MOSHE risk factors, hypertension,  (-) tobacco use and asthma   Neurologic:  - neg neurologic ROS     Cardiovascular: Comment: Denies cardiac symptoms including chest pain, SOB, palpitations,  HUERTA, orthopnea, or PND.       (+) Dyslipidemia hypertension-----fainting (syncope). dysrhythmias (Paroxysmal atrial fibrillation), a-fib, Previous cardiac testing  (-) taking anticoagulants/antiplatelets   METS/Exercise Tolerance: 3 - Able to walk 1-2 blocks without stopping Comment: /reports doing housework including vacuuming.  She goes up and down stairs multiple times daily as has a split level and need to to use stairs to go to the bedroom and family room.    Hematologic:     (+) history of blood transfusion (2012), no previous transfusion reaction, Known PRBC Anitbodies:No  (-) history of blood clots   Musculoskeletal: Comment: H/o closed fracture of odontoid process of axis s/p cervical fusion C1-C2 (2018).  (+) arthritis (right TKA 2012),     GI/Hepatic:     (+) appendicitis (s/p appendectomy 1988. ),     Renal/Genitourinary: Comment: ADILENE and BSO 1988 - neg Renal ROS     Endo:     (+) thyroid problem, hypothyroidism,     Psychiatric/Substance Use:  - neg psychiatric ROS     Infectious Disease: Comment: Completed COVID  Vaccine March 2021.      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               No vital signs taken since this is a virtual visit.       Physical Exam  Constitutional: Awake, alert, cooperative, no apparent distress, and appears stated age.  HENT: Normocephalic   Respiratory: Regular respiratory rate.  Effort is non-labored, quiet, easy breathing.   Musculoskeletal:  Full ROM of neck.   Neurologic: Awake, alert, oriented to name, place and time.   Neuropsychiatric: Calm, cooperative. Normal affect.     **Please note, because this was a virtual visit due to COVID -19 pandemic, a full physical could not be completed.  On the DOS, the OOD of anesthesia will complete the appropriate components of the physical exam. Please refer to the physical examination documented by the anesthesiologist in the anesthesia record on the day of surgery. **      Labs: (personally reviewed)  Care  Everywhere 2021  Na+ 135  K+ 4.0  Cr 0.71      WBC 7.7  Hgb 11.8  Plts 248     PROCEDURES  [facility] interrogation 2021. No arrhythmias were detected     Cardiac Event Monitor Report     Patient: Yuridia Galvan    : 1940  Indication: syncope & collapse  Ordered by: Chris Arauz MD   Dates Recorded: 19-19    1.  The patient was monitored for 24 hours.  2.  Predominant rhythm was sinus rhythm.  3.  Rare PACs and PVCs were observed.  4.  Average heart rate during the monitoring period was 74 beats per minute with range of 54 to 120 beats per minute.  5.  Rare PACs and PVCs were observed.  6.  One symptomatic event was received.  This correlated with sinus rhythm during light activity.  7.  No sustained dysrhythmias were observed.    Echocardiogram 2018  Final Conclusion   Normal left ventricular size. Normal left ventricular systolic function.   Mild left ventricular hypertrophy. Sigmoid shaped interventricular septum.   No obvious regional wall motion abnormalities.   The ejection fraction is visually estimated at 55-60%.   The right ventricle is normal in size and function.   Left atrial size at the upper limits of normal.   No significant valvular heart disease noted.   Cannot estimate PA pressures on this study.   Technically difficult study - contrast was used to enhance endocardial definition due to   suboptimal image quality.   Estimated EF: 55-60%     NM MPI    FINAL CONCLUSIONS   NORMAL pharmacologic stress perfusion imaging study.   No significant symptoms with pharmacologic stress.   There was no ECG evidence diagnostic for of ischemia.   Myocardial perfusion imaging was normal.   LV systolic function was normal without regional wall motion   abnormalities.   Compared to prior study of 10/09, there is no significant change in major   findings.    Outside records reviewed from: Care Everywhere  LABS/DIAGNOSTIC STUDIES:   All labs and imaging personally reviewed      ASSESSMENT and PLAN  Yuridia Galvan is a 80 year old female scheduled to undergo  Excision right hand mass - Right with Braydon Chavez MD on 5/25/2021  at [facility] under general anesthesia    She has the following specific operative considerations:   - MOSHE # of risks 2/8 = low  - VTE risk:  0.5%  - Risk of PONV score = 2.  If > 2, anti-emetic intervention recommended.      #  Cardiology   - Denies known coronary artery disease.  Denies cardiac symptoms. METS:  3. LVEF 55-60%, mild LVH, and no significant valve disease.  RCRI : No serious cardiac risks.  0.4 % risk of major adverse cardiac event.       - Followed by [facility] cardiology with last visit on 5/3/2021 (reviewed note in CE). H/O syncopal episode X2 with cardiac evaluation including Holter, echo and loop recorder implanted.  Through the evaluation, syncope is suspected to be vasovagal.   - Remote h/o of atrial fibrillation post thyroid surgery 16 years ago.  She had a loop recorder implanted in 2019 with brief atrial fibrillation x 2 documented episodes. Her most recent interrogation form April 2021 shows no arrhthymias.  Per cardiology's note, her [id number] = 4 and she is not on anticoagulation due to the history of falls. Take beta blocker as prescribed DOS.   - HTN on triple therapy.  Take amlodipine and metoprolol DOS. Hold losartan DOS.   - HLD, take statin as prescribed DOS.     #  Pulmonary   - no smoking  - Denies pulmonary symptoms  - No inhalers     #  Hematology   -  H/o blood transfusion post TKA in 2014.  Denies reaction.      #  Endocrine   -  Hypothyroidism, take Levothyroxine DOS.    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 check electrolytes DOS.       #  Musculoskeletal   - soft tissue mass with above procedure planned.   - osteoarthritis s/p right TKA 2014.  Consideration for careful positioning   - s/p cervical fusion C1-C2       #  HEENT    - Glaucoma s/p b/l shunt (2014).  Very poor vision.  Frequent falls related to this.  Was instructed to purchase a walker per cardiology for safety reasons and increase activity.  Patient has not done this as of yet. Recommend fall precautions.     #  ID  - COVID-19 testing per surgeon's office  - Completed covid vaccinations    #   Anesthesia considerations   -  Refer to PAC assessment in anesthesia records    # Difficult stick       Arrival time, NPO, shower and medication instructions provided by nursing staff today.    Patient discussed with Dr. Parekh.  Patient is an acceptable candidate for the proposed procedure.  No further diagnostic evaluation is needed.     RIOS Magallanes CNP  Preoperative Assessment Center  [facility]  Phone: [phone]  Fax: [phone]

## 2021-05-19 NOTE — PLAN OF CARE
A user error has taken place: encounter opened in error, closed for administrative reasons.     1500cc

## 2021-05-19 NOTE — ANESTHESIA PREPROCEDURE EVALUATION
Anesthesia Pre-Procedure Evaluation    Patient: Yuridia Galvan   MRN: [id number] : 1940        Preoperative Diagnosis: * No surgery found *   Procedure :      No past medical history on file.   No past surgical history on file.   Allergies   Allergen Reactions     Seasonal Allergies       Social History     Tobacco Use     Smoking status: Not on file   Substance Use Topics     Alcohol use: Not on file      Wt Readings from Last 1 Encounters:   21 74.8 kg (165 lb)        Anesthesia Evaluation   Pt has had prior anesthetic. Type: General and MAC.    No history of anesthetic complications       ROS/MED HX  ENT/Pulmonary: Comment: Glaucoma s/p b/l shunt ()    (+) MOSHE risk factors, hypertension,  (-) tobacco use and asthma   Neurologic:  - neg neurologic ROS     Cardiovascular: Comment: Denies cardiac symptoms including chest pain, SOB, palpitations,  HUERTA, orthopnea, or PND.      (+) Dyslipidemia hypertension-----fainting (syncope). dysrhythmias (Paroxysmal atrial fibrillation), a-fib, Previous cardiac testing  (-) taking anticoagulants/antiplatelets   METS/Exercise Tolerance: 3 - Able to walk 1-2 blocks without stopping Comment: /reports doing housework including vacuuming.  She goes up and down stairs multiple times daily as has a split level and need to to use stairs to go to the bedroom and family room.    Hematologic:     (+) history of blood transfusion (), no previous transfusion reaction, Known PRBC Anitbodies:No  (-) history of blood clots   Musculoskeletal: Comment: H/o closed fracture of odontoid process of axis s/p cervical fusion C1-C2 ().  (+) arthritis (right TKA ),     GI/Hepatic:     (+) appendicitis (s/p appendectomy . ),     Renal/Genitourinary: Comment: ADILENE and BSO  - neg Renal ROS     Endo:     (+) thyroid problem, hypothyroidism,     Psychiatric/Substance Use:  - neg psychiatric ROS     Infectious Disease: Comment: Completed COVID  Vaccine 2021.       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               OUTSIDE LABS:  CBC: No results found for: WBC, HGB, HCT, PLT  BMP: No results found for: NA, POTASSIUM, CHLORIDE, CO2, BUN, CR, GLC  COAGS: No results found for: PTT, INR, FIBR  POC: No results found for: BGM, HCG, HCGS  HEPATIC: No results found for: ALBUMIN, PROTTOTAL, ALT, AST, GGT, ALKPHOS, BILITOTAL, BILIDIRECT, COLLIN  OTHER: No results found for: PH, LACT, A1C, DEEDEE, PHOS, MAG, LIPASE, AMYLASE, TSH, T4, T3, CRP, SED          PAC Discussion and Assessment    ASA Classification: 3  Case is suitable for: [facility]  Anesthetic techniques and relevant risks discussed: GA                  PAC Resident/NP Anesthesia Assessment: Type of service:  Video Visit    Patient verbally consented to video service today: YES    Two identifiers used:  yes (name and )    Video Start Time: 9:30  Video End Time (time video stopped): 9:55    Originating Location (pt. Location): Home    Distant Location (provider location):  home    Mode of Communication:  Video Conference via boarding pass    Please note, because this was a virtual visit, a full physical could not be completed.  On the DOS, the OOD of anesthesia will complete the appropriate components of the physical exam.  --  Yuridia Galvan is a 80-year-old female scheduled for Excision right hand mass - Right with Braydon Chavez MD on 2021  at [facility] under general anesthesia.    Ms. Galvan was seen in Orthopaedic Oncology Surgery Consultation on 21 with Dr. Chavez for evaluation of a right hypothenar mass since . She previously had it evaluated and told there was no surgical treatment options given the proximity of the mass to the neurovascular structures in her hand.  Over the last year, there has been a noticeable increase in it's size.  She also has noticed severe hypothenar pain with the increase in size.  This has subsided more recently and  will only have pain if direct pressure is applied.    She also endorses numbness and paresthesias over the palmar aspect of her index through small fingers, most notably in the small finger. She was treated with antibiotics this past March for cellulitis of this area now resolved. Through Dr. 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  He counseled her on treatment options.  She has opted to proceed with above surgical intervention.     Of significance, Ms. Galvan is followed by cardiology at [facility] with last visit on 5/3/2021 in annual follow-up for h/o two syncopal episodes [id number].  Her first syncopal episode is thought to be due to medication (summer 2017 or 2018?).  Her second syncopal episode occurred POD#1 from glaucoma surgery (11/2018) resulting in cervical neck fracture s/p fusion.  Evaluation included 2 week Holter 12/2018 which was unremarkable.  She had an echocardiogram without significant findings.  She had a loop recorder implanted 10/28/19. Records indicate this showed 4 secondary to pause recorded 2/3/20 but not true pause (undersensed QRS).  She has had brief atrial fibrillation x 2 documented episodes.  She does endorse a h/o  atrial fibrillation associated with thyroid surgery 16 years ago.  Her [id number] = 4.  Per cardiology's note, Ms. Galvan is not  not on anticoagulation due to the history of falls.   Records indicate that her syncope is suspected to be vasovagal due to right hand pain from subcutaneous mass. She has poor balance along with poor vision.  She was  advised to purchase walker for safety and increase activity.       PAC referral for risk assessment and optimization of anesthesia with comorbid conditions of H/o syncope,hypertension, HLD, hypothyroidism, chronic hyponatremia due to SIADH, glaucoma with poor vision and frequent falls (mechanical) due to poor vision.       Dx:  soft tissue  mass    PROCEDURES  [facility] interrogation 2021. No arrhythmias were detected     Cardiac Event Monitor Report 2019    Patient: Yuridia Galvan    : 1940  Indication: syncope & collapse  Ordered by: Chris Arauz MD   Dates Recorded: 19-19    1.  The patient was monitored for 24 hours.  2.  Predominant rhythm was sinus rhythm.  3.  Rare PACs and PVCs were observed.  4.  Average heart rate during the monitoring period was 74 beats per minute with range of 54 to 120 beats per minute.  5.  Rare PACs and PVCs were observed.  6.  One symptomatic event was received.  This correlated with sinus rhythm during light activity.  7.  No sustained dysrhythmias were observed.    Echocardiogram 2018  Final Conclusion   Normal left ventricular size. Normal left ventricular systolic function.   Mild left ventricular hypertrophy. Sigmoid shaped interventricular septum.   No obvious regional wall motion abnormalities.   The ejection fraction is visually estimated at 55-60%.   The right ventricle is normal in size and function.   Left atrial size at the upper limits of normal.   No significant valvular heart disease noted.   Cannot estimate PA pressures on this study.   Technically difficult study - contrast was used to enhance endocardial definition due to   suboptimal image quality.   Estimated EF: 55-60%     NM MPI    FINAL CONCLUSIONS   NORMAL pharmacologic stress perfusion imaging study.   No significant symptoms with pharmacologic stress.   There was no ECG evidence diagnostic for of ischemia.   Myocardial perfusion imaging was normal.   LV systolic function was normal without regional wall motion   abnormalities.   Compared to prior study of 10/09, there is no significant change in major   findings.      He/She:[id number]} has the following specific operative considerations:   - MOSHE # of risks 2/8 = low  - VTE risk:  0.5%  - Risk of PONV score = 2.  If > 2, anti-emetic intervention  recommended.      #  Cardiology   - Denies known coronary artery disease.  Denies cardiac symptoms. METS:  3. LVER 55-60%, mild LVH, and no significant valve disease.  RCRI : No serious cardiac risks.  0.4 % risk of major adverse cardiac event.       - Followed by [facility] cardiology with last visit on 5/3/2021 (reviewed note in CE). H/O syncopal episode X2 with cardiac evaluation including Holter, echo and loop recorder implanted.  Through the evaluation, syncope is suspected to be vasovagal.   - Remote h/o of atrial fibrillation post thyroid surgery 16 years ago.  She had a loop recorder implanted in 2019 with brief atrial fibrillation x 2 documented episodes. Her most recent interrogation form April 2021 showed no arrhthymias.  Per cardiology's note, her [id number] = 4 and she is not on anticoagulation due to the history of falls. Take beta blocker as prescribed DOS.   - HTN on triple therapy.  Take amlodipine and metoprolol DOS. Hold losartan DOS.   - HLD, take statin as prescribed DOS.     #  Pulmonary   - no smoking  - Denies pulmonary symptoms  - No inhalers     #  Hematology   -  H/o blood transfusion post TKA in 2014.  Denies reaction.      #  Endocrine   -  Hypothyroidism, take Levothyroxine DOS.    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 check electrolytes DOS.       #  Musculoskeletal   - soft tissue mass with above procedure planned.   - osteoarthritis s/p right TKA 2014.  Consideration for careful positioning   - s/p cervical fusion C1-C2       #  HEENT    - Glaucoma s/p b/l shunt (2014).  Very poor vision. Frequent falls related to this.  Was instructed to purchase a walker per cardiology for safety reasons and increase activity.  Patient has not done this as of yet.     #  ID  - COVID-19 testing per surgeon's office  - Completed covid vaccinations    #   Anesthesia considerations   -  Refer to PAC assessment in anesthesia records    # Difficult stick       Arrival time, NPO, shower  and medication instructions provided by nursing staff today.    Patient is an acceptable candidate for the proposed procedure.  No further diagnostic evaluation is needed.     **For further details of assessment, testing, and physical exam please see H and P completed on same date.    Reviewed and Signed by PAC Mid-Level Provider/Resident  Mid-Level Provider/Resident: Asuncion NATION CNP  Date: 5/19/2021                                 RIOS Magallanes CNP

## 2021-05-21 DIAGNOSIS — Z11.59 ENCOUNTER FOR SCREENING FOR OTHER VIRAL DISEASES: ICD-10-CM

## 2021-05-21 LAB
LABORATORY COMMENT REPORT: NORMAL
SARS-COV-2 RNA RESP QL NAA+PROBE: NEGATIVE
SARS-COV-2 RNA RESP QL NAA+PROBE: NORMAL
SPECIMEN SOURCE: NORMAL
SPECIMEN SOURCE: NORMAL

## 2021-05-21 PROCEDURE — U0003 INFECTIOUS AGENT DETECTION BY NUCLEIC ACID (DNA OR RNA); SEVERE ACUTE RESPIRATORY SYNDROME CORONAVIRUS 2 (SARS-COV-2) (CORONAVIRUS DISEASE [COVID-19]), AMPLIFIED PROBE TECHNIQUE, MAKING USE OF HIGH THROUGHPUT TECHNOLOGIES AS DESCRIBED BY CMS-2020-01-R: HCPCS | Performed by: ORTHOPAEDIC SURGERY

## 2021-05-21 PROCEDURE — U0005 INFEC AGEN DETEC AMPLI PROBE: HCPCS | Performed by: ORTHOPAEDIC SURGERY

## 2021-05-25 ENCOUNTER — HOSPITAL ENCOUNTER (OUTPATIENT)
Facility: CLINIC | Age: 81
Discharge: HOME | End: 2021-05-25
Attending: ORTHOPAEDIC SURGERY | Admitting: ORTHOPAEDIC SURGERY
Payer: COMMERCIAL

## 2021-05-25 ENCOUNTER — SURGERY (OUTPATIENT)
Age: 81
End: 2021-05-25
Payer: COMMERCIAL

## 2021-05-25 ENCOUNTER — ANESTHESIA (OUTPATIENT)
Dept: SURGERY | Facility: CLINIC | Age: 81
End: 2021-05-25
Payer: COMMERCIAL

## 2021-05-25 ENCOUNTER — ANESTHESIA EVENT (OUTPATIENT)
Dept: SURGERY | Facility: CLINIC | Age: 81
End: 2021-05-25
Payer: COMMERCIAL

## 2021-05-25 VITALS
RESPIRATION RATE: 16 BRPM | HEIGHT: 68 IN | OXYGEN SATURATION: 100 % | DIASTOLIC BLOOD PRESSURE: 81 MMHG | BODY MASS INDEX: 25.26 KG/M2 | TEMPERATURE: 98 F | WEIGHT: 166.67 LBS | SYSTOLIC BLOOD PRESSURE: 146 MMHG | HEART RATE: 67 BPM

## 2021-05-25 DIAGNOSIS — M79.89 SOFT TISSUE MASS: ICD-10-CM

## 2021-05-25 LAB
ABO + RH BLD: NORMAL
ABO + RH BLD: NORMAL
ANION GAP SERPL CALCULATED.3IONS-SCNC: 9 MMOL/L (ref 3–14)
BLD GP AB SCN SERPL QL: NORMAL
BLOOD BANK CMNT PATIENT-IMP: NORMAL
BUN SERPL-MCNC: 18 MG/DL (ref 7–30)
CALCIUM SERPL-MCNC: 8.5 MG/DL (ref 8.5–10.1)
CHLORIDE SERPL-SCNC: 105 MMOL/L (ref 94–109)
CO2 SERPL-SCNC: 19 MMOL/L (ref 20–32)
CREAT SERPL-MCNC: 0.69 MG/DL (ref 0.52–1.04)
ERYTHROCYTE [DISTWIDTH] IN BLOOD BY AUTOMATED COUNT: 16.1 % (ref 10–15)
GFR SERPL CREATININE-BSD FRML MDRD: 83 ML/MIN/{1.73_M2}
GLUCOSE BLDC GLUCOMTR-MCNC: 93 MG/DL (ref 70–99)
GLUCOSE SERPL-MCNC: 86 MG/DL (ref 70–99)
HCT VFR BLD AUTO: 34.7 % (ref 35–47)
HGB BLD-MCNC: 10.7 G/DL (ref 11.7–15.7)
MCH RBC QN AUTO: 23.9 PG (ref 26.5–33)
MCHC RBC AUTO-ENTMCNC: 30.8 G/DL (ref 31.5–36.5)
MCV RBC AUTO: 78 FL (ref 78–100)
PLATELET # BLD AUTO: 317 10E9/L (ref 150–450)
POTASSIUM SERPL-SCNC: 3.7 MMOL/L (ref 3.4–5.3)
RBC # BLD AUTO: 4.48 10E12/L (ref 3.8–5.2)
SODIUM SERPL-SCNC: 133 MMOL/L (ref 133–144)
SPECIMEN EXP DATE BLD: NORMAL
WBC # BLD AUTO: 7.2 10E9/L (ref 4–11)

## 2021-05-25 PROCEDURE — 88342 IMHCHEM/IMCYTCHM 1ST ANTB: CPT | Mod: TC | Performed by: ORTHOPAEDIC SURGERY

## 2021-05-25 PROCEDURE — 86900 BLOOD TYPING SEROLOGIC ABO: CPT | Performed by: NURSE PRACTITIONER

## 2021-05-25 PROCEDURE — 85027 COMPLETE CBC AUTOMATED: CPT | Performed by: ORTHOPAEDIC SURGERY

## 2021-05-25 PROCEDURE — 370N000017 HC ANESTHESIA TECHNICAL FEE, PER MIN: Performed by: ORTHOPAEDIC SURGERY

## 2021-05-25 PROCEDURE — 82962 GLUCOSE BLOOD TEST: CPT

## 2021-05-25 PROCEDURE — 710N000010 HC RECOVERY PHASE 1, LEVEL 2, PER MIN: Performed by: ORTHOPAEDIC SURGERY

## 2021-05-25 PROCEDURE — 80048 BASIC METABOLIC PNL TOTAL CA: CPT | Performed by: ORTHOPAEDIC SURGERY

## 2021-05-25 PROCEDURE — 250N000011 HC RX IP 250 OP 636: Performed by: PHYSICIAN ASSISTANT

## 2021-05-25 PROCEDURE — 250N000009 HC RX 250: Performed by: NURSE ANESTHETIST, CERTIFIED REGISTERED

## 2021-05-25 PROCEDURE — 258N000003 HC RX IP 258 OP 636: Performed by: ANESTHESIOLOGY

## 2021-05-25 PROCEDURE — 88342 IMHCHEM/IMCYTCHM 1ST ANTB: CPT | Mod: 26 | Performed by: PATHOLOGY

## 2021-05-25 PROCEDURE — 250N000011 HC RX IP 250 OP 636: Performed by: NURSE ANESTHETIST, CERTIFIED REGISTERED

## 2021-05-25 PROCEDURE — 272N000001 HC OR GENERAL SUPPLY STERILE: Performed by: ORTHOPAEDIC SURGERY

## 2021-05-25 PROCEDURE — 88307 TISSUE EXAM BY PATHOLOGIST: CPT | Mod: 26 | Performed by: PATHOLOGY

## 2021-05-25 PROCEDURE — 93010 ELECTROCARDIOGRAM REPORT: CPT | Performed by: INTERNAL MEDICINE

## 2021-05-25 PROCEDURE — 710N000012 HC RECOVERY PHASE 2, PER MINUTE: Performed by: ORTHOPAEDIC SURGERY

## 2021-05-25 PROCEDURE — 250N000011 HC RX IP 250 OP 636: Performed by: ANESTHESIOLOGY

## 2021-05-25 PROCEDURE — 86850 RBC ANTIBODY SCREEN: CPT | Performed by: ORTHOPAEDIC SURGERY

## 2021-05-25 PROCEDURE — 250N000013 HC RX MED GY IP 250 OP 250 PS 637: Performed by: ANESTHESIOLOGY

## 2021-05-25 PROCEDURE — 86900 BLOOD TYPING SEROLOGIC ABO: CPT | Performed by: ORTHOPAEDIC SURGERY

## 2021-05-25 PROCEDURE — 999N000141 HC STATISTIC PRE-PROCEDURE NURSING ASSESSMENT: Performed by: ORTHOPAEDIC SURGERY

## 2021-05-25 PROCEDURE — 36415 COLL VENOUS BLD VENIPUNCTURE: CPT | Performed by: ORTHOPAEDIC SURGERY

## 2021-05-25 PROCEDURE — 250N000009 HC RX 250: Performed by: ORTHOPAEDIC SURGERY

## 2021-05-25 PROCEDURE — 999N000054 HC STATISTIC EKG NON-CHARGEABLE

## 2021-05-25 PROCEDURE — 360N000075 HC SURGERY LEVEL 2, PER MIN: Performed by: ORTHOPAEDIC SURGERY

## 2021-05-25 PROCEDURE — 88307 TISSUE EXAM BY PATHOLOGIST: CPT | Mod: TC | Performed by: ORTHOPAEDIC SURGERY

## 2021-05-25 PROCEDURE — 250N000025 HC SEVOFLURANE, PER MIN: Performed by: ORTHOPAEDIC SURGERY

## 2021-05-25 PROCEDURE — 86901 BLOOD TYPING SEROLOGIC RH(D): CPT | Performed by: ORTHOPAEDIC SURGERY

## 2021-05-25 RX ORDER — NALOXONE HYDROCHLORIDE 0.4 MG/ML
0.2 INJECTION, SOLUTION INTRAMUSCULAR; INTRAVENOUS; SUBCUTANEOUS
Status: DISCONTINUED | OUTPATIENT
Start: 2021-05-25 | End: 2021-05-25 | Stop reason: HOSPADM

## 2021-05-25 RX ORDER — LIDOCAINE 40 MG/G
CREAM TOPICAL
Status: DISCONTINUED | OUTPATIENT
Start: 2021-05-25 | End: 2021-05-25 | Stop reason: HOSPADM

## 2021-05-25 RX ORDER — HYDRALAZINE HYDROCHLORIDE 20 MG/ML
2.5-5 INJECTION INTRAMUSCULAR; INTRAVENOUS EVERY 10 MIN PRN
Status: DISCONTINUED | OUTPATIENT
Start: 2021-05-25 | End: 2021-05-25 | Stop reason: HOSPADM

## 2021-05-25 RX ORDER — METOPROLOL TARTRATE 1 MG/ML
1-2 INJECTION, SOLUTION INTRAVENOUS EVERY 5 MIN PRN
Status: DISCONTINUED | OUTPATIENT
Start: 2021-05-25 | End: 2021-05-25 | Stop reason: HOSPADM

## 2021-05-25 RX ORDER — CEFAZOLIN SODIUM 2 G/100ML
2 INJECTION, SOLUTION INTRAVENOUS SEE ADMIN INSTRUCTIONS
Status: DISCONTINUED | OUTPATIENT
Start: 2021-05-25 | End: 2021-05-25 | Stop reason: HOSPADM

## 2021-05-25 RX ORDER — ONDANSETRON 2 MG/ML
4 INJECTION INTRAMUSCULAR; INTRAVENOUS EVERY 30 MIN PRN
Status: DISCONTINUED | OUTPATIENT
Start: 2021-05-25 | End: 2021-05-25 | Stop reason: HOSPADM

## 2021-05-25 RX ORDER — ONDANSETRON 4 MG/1
4 TABLET, ORALLY DISINTEGRATING ORAL EVERY 30 MIN PRN
Status: DISCONTINUED | OUTPATIENT
Start: 2021-05-25 | End: 2021-05-25 | Stop reason: HOSPADM

## 2021-05-25 RX ORDER — FENTANYL CITRATE 50 UG/ML
INJECTION, SOLUTION INTRAMUSCULAR; INTRAVENOUS PRN
Status: DISCONTINUED | OUTPATIENT
Start: 2021-05-25 | End: 2021-05-25

## 2021-05-25 RX ORDER — NALOXONE HYDROCHLORIDE 0.4 MG/ML
0.4 INJECTION, SOLUTION INTRAMUSCULAR; INTRAVENOUS; SUBCUTANEOUS
Status: DISCONTINUED | OUTPATIENT
Start: 2021-05-25 | End: 2021-05-25 | Stop reason: HOSPADM

## 2021-05-25 RX ORDER — DEXAMETHASONE SODIUM PHOSPHATE 4 MG/ML
INJECTION, SOLUTION INTRA-ARTICULAR; INTRALESIONAL; INTRAMUSCULAR; INTRAVENOUS; SOFT TISSUE PRN
Status: DISCONTINUED | OUTPATIENT
Start: 2021-05-25 | End: 2021-05-25

## 2021-05-25 RX ORDER — HYDROMORPHONE HYDROCHLORIDE 1 MG/ML
.3-.5 INJECTION, SOLUTION INTRAMUSCULAR; INTRAVENOUS; SUBCUTANEOUS EVERY 5 MIN PRN
Status: DISCONTINUED | OUTPATIENT
Start: 2021-05-25 | End: 2021-05-25 | Stop reason: HOSPADM

## 2021-05-25 RX ORDER — LIDOCAINE HYDROCHLORIDE 20 MG/ML
INJECTION, SOLUTION INFILTRATION; PERINEURAL PRN
Status: DISCONTINUED | OUTPATIENT
Start: 2021-05-25 | End: 2021-05-25

## 2021-05-25 RX ORDER — MAGNESIUM HYDROXIDE 1200 MG/15ML
LIQUID ORAL PRN
Status: DISCONTINUED | OUTPATIENT
Start: 2021-05-25 | End: 2021-05-25 | Stop reason: HOSPADM

## 2021-05-25 RX ORDER — SODIUM CHLORIDE, SODIUM LACTATE, POTASSIUM CHLORIDE, CALCIUM CHLORIDE 600; 310; 30; 20 MG/100ML; MG/100ML; MG/100ML; MG/100ML
INJECTION, SOLUTION INTRAVENOUS CONTINUOUS
Status: DISCONTINUED | OUTPATIENT
Start: 2021-05-25 | End: 2021-05-25 | Stop reason: HOSPADM

## 2021-05-25 RX ORDER — PROPOFOL 10 MG/ML
INJECTION, EMULSION INTRAVENOUS PRN
Status: DISCONTINUED | OUTPATIENT
Start: 2021-05-25 | End: 2021-05-25

## 2021-05-25 RX ORDER — OXYCODONE HYDROCHLORIDE 5 MG/1
5 TABLET ORAL EVERY 6 HOURS PRN
Qty: 30 TABLET | Refills: 0 | Status: SHIPPED | OUTPATIENT
Start: 2021-05-25

## 2021-05-25 RX ORDER — CEFAZOLIN SODIUM 2 G/100ML
2 INJECTION, SOLUTION INTRAVENOUS
Status: COMPLETED | OUTPATIENT
Start: 2021-05-25 | End: 2021-05-25

## 2021-05-25 RX ORDER — OXYCODONE HYDROCHLORIDE 5 MG/1
5 TABLET ORAL EVERY 4 HOURS PRN
Status: DISCONTINUED | OUTPATIENT
Start: 2021-05-25 | End: 2021-05-25 | Stop reason: HOSPADM

## 2021-05-25 RX ORDER — FENTANYL CITRATE 50 UG/ML
25-50 INJECTION, SOLUTION INTRAMUSCULAR; INTRAVENOUS
Status: DISCONTINUED | OUTPATIENT
Start: 2021-05-25 | End: 2021-05-25 | Stop reason: HOSPADM

## 2021-05-25 RX ORDER — AMOXICILLIN 250 MG
1 CAPSULE ORAL PRN
Qty: 30 TABLET | Refills: 0 | Status: SHIPPED | OUTPATIENT
Start: 2021-05-25

## 2021-05-25 RX ORDER — ONDANSETRON 2 MG/ML
INJECTION INTRAMUSCULAR; INTRAVENOUS PRN
Status: DISCONTINUED | OUTPATIENT
Start: 2021-05-25 | End: 2021-05-25

## 2021-05-25 RX ORDER — DIMENHYDRINATE 50 MG/ML
25 INJECTION, SOLUTION INTRAMUSCULAR; INTRAVENOUS
Status: DISCONTINUED | OUTPATIENT
Start: 2021-05-25 | End: 2021-05-25 | Stop reason: HOSPADM

## 2021-05-25 RX ADMIN — PROPOFOL 30 MG: 10 INJECTION, EMULSION INTRAVENOUS at 15:19

## 2021-05-25 RX ADMIN — SODIUM CHLORIDE, POTASSIUM CHLORIDE, SODIUM LACTATE AND CALCIUM CHLORIDE: 600; 310; 30; 20 INJECTION, SOLUTION INTRAVENOUS at 15:10

## 2021-05-25 RX ADMIN — FENTANYL CITRATE 25 MCG: 50 INJECTION INTRAMUSCULAR; INTRAVENOUS at 16:48

## 2021-05-25 RX ADMIN — LIDOCAINE HYDROCHLORIDE 80 MG: 20 INJECTION, SOLUTION INFILTRATION; PERINEURAL at 15:18

## 2021-05-25 RX ADMIN — ONDANSETRON 4 MG: 2 INJECTION INTRAMUSCULAR; INTRAVENOUS at 15:52

## 2021-05-25 RX ADMIN — PROPOFOL 120 MG: 10 INJECTION, EMULSION INTRAVENOUS at 15:18

## 2021-05-25 RX ADMIN — PROPOFOL 30 MG: 10 INJECTION, EMULSION INTRAVENOUS at 15:21

## 2021-05-25 RX ADMIN — CEFAZOLIN 2 G: 10 INJECTION, POWDER, FOR SOLUTION INTRAVENOUS at 15:25

## 2021-05-25 RX ADMIN — DEXAMETHASONE SODIUM PHOSPHATE 4 MG: 4 INJECTION, SOLUTION INTRAMUSCULAR; INTRAVENOUS at 15:33

## 2021-05-25 RX ADMIN — SODIUM CHLORIDE 110 ML: 900 IRRIGANT IRRIGATION at 15:51

## 2021-05-25 RX ADMIN — FENTANYL CITRATE 25 MCG: 50 INJECTION, SOLUTION INTRAMUSCULAR; INTRAVENOUS at 15:30

## 2021-05-25 RX ADMIN — OXYCODONE HYDROCHLORIDE 5 MG: 5 TABLET ORAL at 17:16

## 2021-05-25 RX ADMIN — FENTANYL CITRATE 25 MCG: 50 INJECTION, SOLUTION INTRAMUSCULAR; INTRAVENOUS at 15:35

## 2021-05-25 ASSESSMENT — LIFESTYLE VARIABLES: TOBACCO_USE: 0

## 2021-05-25 ASSESSMENT — ENCOUNTER SYMPTOMS: DYSRHYTHMIAS: 1

## 2021-05-25 ASSESSMENT — MIFFLIN-ST. JEOR: SCORE: 1284.5

## 2021-05-25 NOTE — PROGRESS NOTES
Anesthesia contacted to request sign out for phase II.  Pt assisted to commode w/ 2 staff . She voided .  After no success x 2 on bedpan. Eating crackers and drinking water . Taking oral meds .

## 2021-05-25 NOTE — ANESTHESIA POSTPROCEDURE EVALUATION
"Patient: Yuridia Galvan    Procedure(s):  Excision right hand mass    Diagnosis:Soft tissue mass [M79.89]  Diagnosis Additional Information: No value filed.    Anesthesia Type:  General    Note:  Disposition: Outpatient   Postop Pain Control: Uneventful            Sign Out: Well controlled pain   PONV: No   Neuro/Psych: Uneventful            Sign Out: Acceptable/Baseline neuro status   Airway/Respiratory: Uneventful            Sign Out: Acceptable/Baseline resp. status   CV/Hemodynamics: Uneventful            Sign Out: Acceptable CV status; No obvious hypovolemia; No obvious fluid overload   Other NRE: NONE   DID A NON-ROUTINE EVENT OCCUR? No         Patient Vitals for the past 24 hrs:   BP Temp Temp src Pulse Resp SpO2 Height Weight   05/25/21 1745 -- -- -- -- -- 96 % -- --   05/25/21 1730 (!) 154/108 -- -- 67 -- 99 % -- --   05/25/21 1700 (!) 154/83 36.4  C (97.5  F) Axillary 61 16 97 % -- --   05/25/21 1645 (!) 143/80 36.6  C (97.8  F) Axillary 65 16 98 % -- --   05/25/21 1630 (!) 136/96 -- -- 64 15 100 % -- --   05/25/21 1614 (!) 145/87 36.4  C (97.6  F) Axillary 82 16 100 % -- --   05/25/21 1241 133/81 36.9  C (98.4  F) Oral 66 20 99 % 1.727 m (5' 8\") 75.6 kg (166 lb 10.7 oz)       Last vitals:  Vitals:    05/25/21 1700 05/25/21 1730 05/25/21 1745   BP: (!) 154/83 (!) 154/108    Pulse: 61 67    Resp: 16     Temp: 36.4  C (97.5  F)     SpO2: 97% 99% 96%       Last vitals prior to Anesthesia Care Transfer:  CRNA VITALS  5/25/2021 1541 - 5/25/2021 1641      5/25/2021             Pulse:  81    Ht Rate:  82    SpO2:  100 %    Resp Rate (observed):  (!) 3          Electronically Signed By: Virgen Marshall MD  May 25, 2021  6:08 PM  "

## 2021-05-25 NOTE — ANESTHESIA PREPROCEDURE EVALUATION
Anesthesia Pre-Procedure Evaluation    Patient: Yuridia Galvan   MRN: [id number] : 1940        Preoperative Diagnosis: Soft tissue mass [M79.89]   Procedure : Procedure(s):  Excision right hand mass     Past Medical History:   Diagnosis Date     Arthritis      Closed fracture of odontoid process of axis (H) 2018     History of blood transfusion      Hypertension      Soft tissue mass     right hand     Thyroid disease      Vasovagal syncope       Past Surgical History:   Procedure Laterality Date     BACK SURGERY  2018    C1-C2 fusion     CERVICAL FUSION  2018    C1-C2     ELECTRONIC ANALYSIS, IMPLANTABLE LOOP RECORDER (ILR)       ENT SURGERY      T&A age 10      GYN SURGERY      ADILENE and BSO     ORTHOPEDIC SURGERY      right TKA 2012     THYROID LOBECTOMY Left 2008      Allergies   Allergen Reactions     Seasonal Allergies       Social History     Tobacco Use     Smoking status: Never Smoker     Smokeless tobacco: Never Used   Substance Use Topics     Alcohol use: Yes     Comment: wine daily - 1 glass before supper      Wt Readings from Last 1 Encounters:   21 75.6 kg (166 lb 10.7 oz)        Anesthesia Evaluation   Pt has had prior anesthetic. Type: General and MAC.    No history of anesthetic complications       ROS/MED HX  ENT/Pulmonary: Comment: Glaucoma s/p b/l shunt ()    (+) MOSHE risk factors, hypertension,  (-) tobacco use and asthma   Neurologic:  - neg neurologic ROS     Cardiovascular: Comment: Denies cardiac symptoms including chest pain, SOB, palpitations,  HUERTA, orthopnea, or PND.      (+) Dyslipidemia hypertension-----fainting (syncope). dysrhythmias (Paroxysmal atrial fibrillation), a-fib, Previous cardiac testing  (-) taking anticoagulants/antiplatelets   METS/Exercise Tolerance: 3 - Able to walk 1-2 blocks without stopping Comment: /reports doing housework including vacuuming.  She goes up and down stairs multiple times daily as has a split level and need to to use stairs to go to  the bedroom and family room.    Hematologic:     (+) history of blood transfusion (2012), no previous transfusion reaction, Known PRBC Anitbodies:No  (-) history of blood clots   Musculoskeletal: Comment: H/o closed fracture of odontoid process of axis s/p cervical fusion C1-C2 (2018).  (+) arthritis (right TKA 2012),     GI/Hepatic:     (+) appendicitis (s/p appendectomy 1988. ),     Renal/Genitourinary: Comment: ADILENE and BSO 1988 - neg Renal ROS     Endo:     (+) thyroid problem, hypothyroidism,     Psychiatric/Substance Use:  - neg psychiatric ROS     Infectious Disease: Comment: Completed COVID  Vaccine March 2021.      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            Physical Exam    Airway        Mallampati: II   TM distance: > 3 FB      Respiratory Devices and Support         Dental           Cardiovascular          Rhythm and rate: regular and normal     Pulmonary           breath sounds clear to auscultation           OUTSIDE LABS:  CBC:   Lab Results   Component Value Date    WBC 7.2 05/25/2021    HGB 10.7 (L) 05/25/2021    HCT 34.7 (L) 05/25/2021     05/25/2021     BMP: No results found for: NA, POTASSIUM, CHLORIDE, CO2, BUN, CR, GLC  COAGS: No results found for: PTT, INR, FIBR  POC:   Lab Results   Component Value Date    BGM 93 05/25/2021     HEPATIC: No results found for: ALBUMIN, PROTTOTAL, ALT, AST, GGT, ALKPHOS, BILITOTAL, BILIDIRECT, COLLIN  OTHER: No results found for: PH, LACT, A1C, DEEDEE, PHOS, MAG, LIPASE, AMYLASE, TSH, T4, T3, CRP, SED    Anesthesia Plan    ASA Status:  3   NPO Status:  NPO Appropriate    Anesthesia Type: General.     - Airway: LMA   Induction: Intravenous.   Maintenance: Balanced.        Consents    Anesthesia Plan(s) and associated risks, benefits, and realistic alternatives discussed. Questions answered and patient/representative(s) expressed understanding.     - Discussed with:  Patient         Postoperative Care    Pain  management: Oral pain medications, Multi-modal analgesia.   PONV prophylaxis: Ondansetron (or other 5HT-3)     Comments:                Reji Garcia MD

## 2021-05-25 NOTE — OP NOTE
DATE OF SURGERY: 5/25/2021    PREOPERATIVE DIAGNOSIS: Right hand mass    POSTOPERATIVE DIAGNOSIS: Right hand mass    PROCEDURE: Excision right hand mass, deep, 3 cm or greater    SURGEON: Braydon Chavez MD     ASSISTANT: Nusrat De aL Vega MD    PATIENT HISTORY: This patient has a 20-year history of this hand mass.  Is causing some alteration in sensation in the plantar aspect of her second through fourth digits.  She also had a episode of cellulitis associated with this and about 2 months ago.  This is completely resolved.  Patient understands that there is a risk of some permanent numbness in the hand as well as tingling stiffness bleeding and infection.    DESCRIPTION OF PROCEDURE: The patient was placed prone on with successful duction of general anesthesia.  The right hand was washed and sterilely prepped and draped.  Made an incision through some of the palmar creases and dissected through the subcutaneous fat with cautery.  I opened up the palmar fascia and can see this mass.  There were prominent vessels all throughout it and so it looked like a vascular malformation.  We were able to cauterize some prominent vessels exiting the mass on the radial side.  We  from the deep neurovascular bundle.  There was a smaller nerve passing closer to the mass which I dissected out.  There was some very small threadlike branches passing through the mass which we sacrificed.  After removing the mass intact we copiously irrigated and deflated her tourniquet.  I cauterized 1 small vessel distally and then we closed with nylon in the skin.  The mass was sent to pathology in formalin.  A sterile dressing was applied to the patient and she was extubated taken to the recovery room in stable condition.  The estimated blood loss is 5 mL.  There were no complications.  I was present for all critical portions of the procedure.    Braydon Chavez MD

## 2021-05-25 NOTE — DISCHARGE INSTRUCTIONS
Same-Day Surgery   Adult Discharge Orders & Instructions     For 24 hours after surgery:  1. Get plenty of rest.  A responsible adult must stay with you for at least 24 hours after you leave the hospital.   2. Pain medication can slow your reflexes. Do not drive or use heavy equipment.  If you have weakness or tingling, don't drive or use heavy equipment until this feeling goes away.  3. Mixing alcohol and pain medication can cause dizziness and slow your breathing. It can even be fatal. Do not drink alcohol while taking pain medication.  4. Avoid strenuous or risky activities.  Ask for help when climbing stairs.   5. You may feel lightheaded.  If so, sit for a few minutes before standing.  Have someone help you get up.   6. If you have nausea (feel sick to your stomach), drink only clear liquids such as apple juice, ginger ale, broth or 7-Up.  Rest may also help.  Be sure to drink enough fluids.  Move to a regular diet as you feel able. Take pain medications with a small amount of solid food, such as toast or crackers, to avoid nausea.   7. A slight fever is normal. Call the doctor if your fever is over 100 F (37.7 C) (taken under the tongue) or lasts longer than 24 hours.  8. You may have a dry mouth, muscle aches, trouble sleeping or a sore throat.  These symptoms should go away after 24 hours.  9. Do not make important or legal decisions.   Pain Management:      1. Take pain medication (if prescribed) for pain as directed by your physician.        2. WARNING: If the pain medication you have been prescribed contains Tylenol  (acetaminophen), DO NOT take additional doses of Tylenol (acetaminophen).     Call your doctor for any of the followin.  Signs of infection (fever, growing tenderness at the surgery site, severe pain, a large amount of drainage or bleeding, foul-smelling drainage, redness, swelling).    2.  It has been over 8 to 10 hours since surgery and you are still not able to urinate (pee).    3.   Headache for over 24 hours.    4.  Numbness, tingling or weakness the day after surgery (if you had spinal anesthesia).  To contact a doctor, call _____________________________________ or:      [phone] and ask for the Resident On Call for:          __________________________________________ (answered 24 hours a day)      Emergency Department:  [place]: [phone]  [facility] [facility]: [phone]               Rev. 10/2014

## 2021-05-25 NOTE — ANESTHESIA CARE TRANSFER NOTE
Patient: Yuridia Galvan    Procedure(s):  Excision right hand mass    Diagnosis: Soft tissue mass [M79.89]  Diagnosis Additional Information: No value filed.    Anesthesia Type:   General     Note:    Oropharynx: oropharynx clear of all foreign objects  Level of Consciousness: drowsy and iatrogenic sedation  Oxygen Supplementation: face mask  Level of Supplemental Oxygen (L/min / FiO2): 8  Independent Airway: airway patency satisfactory and stable  Dentition: dentition unchanged  Vital Signs Stable: post-procedure vital signs reviewed and stable  Report to RN Given: handoff report given  Patient transferred to: PACU    Handoff Report: Identifed the Patient, Identified the Reponsible Provider, Reviewed the pertinent medical history, Discussed the surgical course, Reviewed Intra-OP anesthesia mangement and issues during anesthesia, Set expectations for post-procedure period and Allowed opportunity for questions and acknowledgement of understanding      Vitals: (Last set prior to Anesthesia Care Transfer)  CRNA VITALS  5/25/2021 1541 - 5/25/2021 1614      5/25/2021             Pulse:  81    Ht Rate:  82    SpO2:  100 %    Resp Rate (observed): 12        Electronically Signed By: RIOS Limon CRNA  May 25, 2021  4:14 PM

## 2021-05-25 NOTE — ANESTHESIA PROCEDURE NOTES
Airway       Patient location during procedure: OR       Procedure Start/Stop Times: 5/25/2021 3:18 PM and 5/25/2021 3:20 PM  Staff -        Anesthesiologist:  Virgen Marshall MD       CRNA: Lorena Ramos APRN CRNA       Other Anesthesia Staff: Evelyne Montoya       Performed By: SRNA  Consent for Airway        Urgency: elective  Indications and Patient Condition       Indications for airway management: michael-procedural, airway protection and altered level of consciousness       Induction type:intravenous       Mask difficulty assessment: 0 - not attempted    Final Airway Details       Final airway type: supraglottic airway    Supraglottic Airway Details        Type: LMA       Brand: Ambu AuraGain       LMA size: 4    Post intubation assessment        Placement verified by: capnometry, equal breath sounds and chest rise        Number of attempts at approach: 1       Secured with: pink tape       Ease of procedure: easy       Dentition: Intact and Unchanged

## 2021-05-26 LAB — INTERPRETATION ECG - MUSE: NORMAL

## 2021-05-27 ENCOUNTER — TELEPHONE (OUTPATIENT)
Dept: ORTHOPEDICS | Facility: CLINIC | Age: 81
End: 2021-05-27

## 2021-05-27 LAB — COPATH REPORT: NORMAL

## 2021-05-27 NOTE — TELEPHONE ENCOUNTER
[facility] Call Center    Phone Message    May a detailed message be left on voicemail: yes     Reason for Call: Other: Patient had surgery with Dr. Chavez on 5/25 and needs some help on how to change her dressing.      Please call patient to discuss.    Action Taken: Message routed to:  [facility] ortho    Travel Screening: Not Applicable

## 2021-05-27 NOTE — TELEPHONE ENCOUNTER
I spoke to Yuridia about her wound.  She should keep the dressing on until postop day 5.  If she has external sutures, then she should cover it when showering.  If she has no external sutures, she can shower and get the incision wet.  No lotions or creams or ointments on the wound.  Keep the incision covered so as not to injure it or get it caught on anything with the use of her hand.  Okay to air it out occasionally.  Will update restrictions at postop follow-up based on how things are looking.  She agrees with the plan and all questions have been answered.

## 2021-06-09 ENCOUNTER — OFFICE VISIT (OUTPATIENT)
Dept: ORTHOPEDICS | Facility: CLINIC | Age: 81
End: 2021-06-09
Payer: COMMERCIAL

## 2021-06-09 DIAGNOSIS — D21.9 ANGIOMYOMA: Primary | ICD-10-CM

## 2021-06-09 PROCEDURE — 99024 POSTOP FOLLOW-UP VISIT: CPT | Mod: GC | Performed by: ORTHOPAEDIC SURGERY

## 2021-06-09 NOTE — LETTER
6/9/2021         RE: Yuridia Galvan  [address] [facility] [address]        Dear Colleague,    Thank you for referring your patient, Yuridia Galvan, to the [facility] [place]. Please see a copy of my visit note below.    Chief Complaint: wound check right hand  POSTOPERATIVE DIAGNOSIS: Right hand mass     5/25/21 PROCEDURE: Excision right hand mass, deep, 3 cm or greater    HPI: Yuridia is a 80-year-old female here with her  today for follow-up 2 weeks status post excision of right hand mass by Dr. Chavez.  Patient reports that overall she is doing well.  She takes Tylenol occasionally for pain.  She notices some irritation after prolonged use of the hand or when using it for tasks like eating.  She still has some numbness and tingling of fingers 2 through 4, but seems to be improving a little.  She feels there is some weakness as well.  Overall her pain is much improved from prior to surgery.  No other concerns.    Physical Exam: Harvey is a pleasant 80-year-old female who is alert and oriented no apparent distress.  Her right hand dressings have been removed.  External sutures are present in the right palmar hand incision.  There is excellent healing.  No erythema or drainage.  No swelling.  Resolving ecchymosis.  No palpable mass.  She has full flexion and extension of the fingers.  Full opposition.  She does have some decreased sensation to light touch of the fingers 2 through 4 at the tips.      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  She can use vitamin E oil or cocoa butter with massage to the incision.  This will help improve the appearance and flatten it out.  She can get the hand wet, but no soaking it and then another 1 to 2 weeks.  No repetitive use of tools  with the right hand for another 3 to 4 weeks, then gradually return back to full cavities.  We would expect her numbness and tingling to gradually resolve over time.  I think she would benefit from hand therapy to work on strengthening and desensitization.  We would not expect this mass to recur.  She can certainly call if she has any problems.  Otherwise she will follow-up as needed.  She agrees with the plan of care.  All questions answered.  Patient was also examined by Dr. Chavez and he agrees with the plan of care.     I was present with the PA during the history and exam.  I discussed the case with the PA and agree with the findings as documented in the assessment and plan.

## 2021-06-09 NOTE — NURSING NOTE
Reason For Visit:   Chief Complaint   Patient presents with     Surgical Followup     2 wk post-op right hand mass excision DOS 5/25/21        There were no vitals taken for this visit.    Pain Assessment  Patient Currently in Pain: Yes  0-10 Pain Scale: 1  Primary Pain Location: Hand(right hand)    Xochitl Smith ATC

## 2021-06-09 NOTE — PROGRESS NOTES
Chief Complaint: wound check right hand  POSTOPERATIVE DIAGNOSIS: Right hand mass     5/25/21 PROCEDURE: Excision right hand mass, deep, 3 cm or greater    HPI: Yuridia is a 80-year-old female here with her  today for follow-up 2 weeks status post excision of right hand mass by Dr. Chavez.  Patient reports that overall she is doing well.  She takes Tylenol occasionally for pain.  She notices some irritation after prolonged use of the hand or when using it for tasks like eating.  She still has some numbness and tingling of fingers 2 through 4, but seems to be improving a little.  She feels there is some weakness as well.  Overall her pain is much improved from prior to surgery.  No other concerns.    Physical Exam: Harvey is a pleasant 80-year-old female who is alert and oriented no apparent distress.  Her right hand dressings have been removed.  External sutures are present in the right palmar hand incision.  There is excellent healing.  No erythema or drainage.  No swelling.  Resolving ecchymosis.  No palpable mass.  She has full flexion and extension of the fingers.  Full opposition.  She does have some decreased sensation to light touch of the fingers 2 through 4 at the tips.      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  She can use vitamin E oil or cocoa butter with massage to the incision.  This will help improve the appearance and flatten it out.  She can get the hand wet, but no soaking it and then another 1 to 2 weeks.  No repetitive use of tools with the right hand for another 3 to 4 weeks, then gradually return back to full cavities.  We would expect her numbness and tingling to gradually resolve over time.  I think she would benefit from hand therapy to work on strengthening and desensitization.  We  would not expect this mass to recur.  She can certainly call if she has any problems.  Otherwise she will follow-up as needed.  She agrees with the plan of care.  All questions answered.  Patient was also examined by Dr. Chavez and he agrees with the plan of care.

## 2021-07-14 ENCOUNTER — THERAPY VISIT (OUTPATIENT)
Dept: OCCUPATIONAL THERAPY | Facility: CLINIC | Age: 81
End: 2021-07-14
Attending: PHYSICIAN ASSISTANT
Payer: COMMERCIAL

## 2021-07-14 DIAGNOSIS — D21.9 ANGIOMYOMA: ICD-10-CM

## 2021-07-14 DIAGNOSIS — M79.641 PAIN OF RIGHT HAND: ICD-10-CM

## 2021-07-14 PROCEDURE — 97140 MANUAL THERAPY 1/> REGIONS: CPT | Mod: GO | Performed by: OCCUPATIONAL THERAPIST

## 2021-07-14 PROCEDURE — 97112 NEUROMUSCULAR REEDUCATION: CPT | Mod: GO | Performed by: OCCUPATIONAL THERAPIST

## 2021-07-14 PROCEDURE — 97165 OT EVAL LOW COMPLEX 30 MIN: CPT | Mod: GO | Performed by: OCCUPATIONAL THERAPIST

## 2021-07-14 NOTE — PROGRESS NOTES
Hand Therapy Initial Evaluation    Current Date:  7/14/2021  Referring Physician:Kimberly Garcia PA-C    Diagnosis: Right hand mass  DOS: 5/25/21  Procedure:  Excision right hand mass, deep, 3 cm or greater  Post:  7w 1d    Subjective:  Yuridia Galvan is a 80 year old right hand dominant female.     Patient reports symptoms of pain, stiffness/loss of motion, weakness/loss of strength, numbness, colder,and tingling  of the right hand which occurred due to the above stated surgery. Since onset symptoms are Gradually getting better.  Special tests:  x-ray, MRI and CT.  Previous treatment: none.    General health as reported by patient is good.  Pertinent medical history includes:Concussions/Dizziness, High Blood Pressure, Implated Device, Osteoarthritis, Thyroid Problems, Chills  Medical allergies:none.  Surgical history: orthopedic: (R) knee replacement.  Medication history: High Blood Pressure, Pain, Thyroid.    Occupational Profile Information:  Current occupation is retired  Prior functional level:  no limitations  Barriers include:none  Mobility: Vision impairment affecting mobility (glaucoma)  Transportation: does not drive due to poor eye sight  Leisure activities/hobbies: gardening, painting    Upper Extremity Functional Index Score:  SCORE:   Column Totals: /80: 49   (A lower score indicates greater disability.)    Objective:  Pain Level (Scale 0-10)   7/14/2021   At Rest 0/10   With Use 1-2/10     Pain Description  Date 7/14/2021   Location  right hand   Pain Quality discomfort   Frequency intermittent     Pain is worst  daytime   Exacerbated by  gripping, pressure on scar area   Relieved by rest   Progression Gradually improving     Edema  Mild    Sensation   Decreased Median Nerve distribution per pt report and Decreased Ulnar Nerve distribution per pt report.  Patient report numbness and cold fingertip especially in digits 2-5    ROM : able to make a tight fist on (R)    Strength   Painfree  (Measured in pounds)  Pain Report:  + mild    ++ moderate    +++ severe    7/14/2021 7/14/2021   Trials Right Left   1  2  3 23  29+  25 44  44  46   Average 26 45     Lat Pinch 7/14/2021 7/14/2021   Trials Right Left   1  2  3 11 12   Average       3 Pt Pinch 7/14/2021 7/14/2021   Trials Right Left   1  2  3 9 10   Average       Palpation  Pain Report:  - none    + mild    ++ moderate    +++ severe     7/14/21      scar +      Vaolar MCPs of 2-5 +++                                       Color/Temperature:     []    Normal  [x]    Abnormal  (R) hand colder than left     Scar:  []    NA           [x]  Adherent      []   Non-adherent       [x]   Raised     []   Flattened      Scar present in   Proximal palm       Assessment:  Patient presents with symptoms consistent with diagnosis of right hand pain,  with surgical  intervention.     Patient's limitations or Problem List includes:  Pain, Weakness, Sensory disturbance, Adherent scarring, Decreased  and Adherence in connective tissue of the right hand which interferes with the patient's ability to perform Self Care Tasks (dressing, bathing), Recreational Activities and Household Chores as compared to previous level of function.    Rehab Potential:  Excellent - Return to full activity, no limitations    Patient will benefit from skilled Occupational Therapy to increase flexibility, overall strength,  strength and sensation and decrease pain and adherence of scarring to return to previous activity level and resume normal daily tasks and to reach their rehab potential.    Barriers to Learning:  No barrier    Communication Issues:  Patient appears to be able to clearly communicate and understand verbal and written communication and follow directions correctly.    Chart Review: Chart Review, Brief history including review of medical and/or therapy records relating to the presenting problem and Simple history review with patient    Identified Performance  Deficits: bathing/showering, dressing, home establishment and management, meal preparation and cleanup and leisure activities    Assessment of Occupational Performance:  5 or more Performance Deficits    Clinical Decision Making (Complexity): Low complexity    Treatment Explanation:  The following has been discussed with the patient:  RX ordered/plan of care  Anticipated outcomes  Possible risks and side effects    Plan:  Frequency:  1 X week, once daily  Duration:  for 4 weeks    Treatment Plan:   Modalities:  US and Paraffin  Therapeutic Exercise:  AROM, PROM, Tendon Gliding, Isotonics and Isometrics  Neuromuscular re-education:  Nerve Gliding, Desensitization and Kinesiotaping  Manual Techniques:  Scar mobilization    Discharge Plan:  Achieve all LTG.  Independent in home treatment program.  Reach maximal therapeutic benefit.    Home Exercise Program:   Desensitization   Scar massage    Next Visit:  Scar mobs  Desensitization  Strengthening

## 2021-07-21 ENCOUNTER — THERAPY VISIT (OUTPATIENT)
Dept: OCCUPATIONAL THERAPY | Facility: CLINIC | Age: 81
End: 2021-07-21
Payer: COMMERCIAL

## 2021-07-21 DIAGNOSIS — D21.9 ANGIOMYOMA: Primary | ICD-10-CM

## 2021-07-21 DIAGNOSIS — M79.641 PAIN OF RIGHT HAND: ICD-10-CM

## 2021-07-21 PROCEDURE — 97112 NEUROMUSCULAR REEDUCATION: CPT | Mod: GO | Performed by: OCCUPATIONAL THERAPIST

## 2021-07-21 PROCEDURE — 97140 MANUAL THERAPY 1/> REGIONS: CPT | Mod: GO | Performed by: OCCUPATIONAL THERAPIST

## 2021-07-28 ENCOUNTER — THERAPY VISIT (OUTPATIENT)
Dept: OCCUPATIONAL THERAPY | Facility: CLINIC | Age: 81
End: 2021-07-28
Payer: COMMERCIAL

## 2021-07-28 DIAGNOSIS — D21.9 ANGIOMYOMA: Primary | ICD-10-CM

## 2021-07-28 DIAGNOSIS — M79.641 PAIN OF RIGHT HAND: ICD-10-CM

## 2021-07-28 PROCEDURE — 97112 NEUROMUSCULAR REEDUCATION: CPT | Mod: GO | Performed by: OCCUPATIONAL THERAPIST

## 2021-07-28 PROCEDURE — 97140 MANUAL THERAPY 1/> REGIONS: CPT | Mod: GO | Performed by: OCCUPATIONAL THERAPIST

## 2021-07-28 NOTE — PROGRESS NOTES
SOAP Note - Hand Therapy - Objective Information    Current Date:  7/28/2021  Referring Physician:Kimberly Garcia PA-C    Diagnosis: Right hand mass  DOS: 5/25/21  Procedure:  Excision right hand mass, deep, 3 cm or greater  Post:  9w 1d    Yuridia Galvan is a 80 year old right hand dominant female.     Patient reports symptoms of pain, stiffness/loss of motion, weakness/loss of strength, numbness, colder,and tingling  of the right hand which occurred due to the above stated surgery.     Occupational Profile Information:  Current occupation is retired  S:  Subjective changes as noted by patient: I just want the hand to be normal and it isn't normal yet.  The fingertips are still numb.  The palm feel good.  I keep doing the exercises.  Functional changes noted by patient: Able to put eye drops in my eyes now.      O:  Pain Level (Scale 0-10)   7/14/2021 7/28/21   At Rest 0/10    With Use 1-2/10 1-2/10     Pain Description  Date 7/14/2021   Location  right hand   Pain Quality discomfort   Frequency intermittent     Pain is worst  daytime   Exacerbated by  gripping, pressure on scar area   Relieved by rest   Progression Gradually improving     Edema  Mild    Sensation   Decreased Median Nerve distribution per pt report and Decreased Ulnar Nerve distribution per pt report.  Patient report numbness and cold fingertip especially in digits 2-5    [place] Monofilament Sensory Testing Results:  Fingers of Right Hand  7/28/21   1st 3.61   2nd 3.61   3rd 6.65   4th 6.65   5th 3.61   1.65-2.83=normal  3.22-3.61=diminished light touch  3.84-4.31=diminished protective sensation  4.56=loss of protective sensation  6.65=deep pressure sensation  None=Tested with no response    ROM : able to make a tight fist on (R)    Strength   Painfree (Measured in pounds)  Pain Report:  + mild    ++ moderate    +++ severe    7/14/2021 7/14/2021   Trials Right Left   1  2  3 23  29+  25 44  44  46   Average 26 45     Lat  Pinch 7/14/2021 7/14/2021   Trials Right Left   1  2  3 11 12   Average       3 Pt Pinch 7/14/2021 7/14/2021   Trials Right Left   1  2  3 9 10   Average       Palpation  Pain Report:  - none    + mild    ++ moderate    +++ severe     7/14/21 7/28/21     scar + -     Volar MCPs of 2-5 +++ ++                                      Color/Temperature:     []    Normal  [x]    Abnormal  (R) hand colder than left     Scar:  []    NA           [x]  Adherent      []   Non-adherent       [x]   Raised     []   Flattened      Scar present in   Proximal palm       Please refer to the daily flowsheet for treatment provided today.   Home Exercise Program:   Desensitization   Scar massage    Next Visit:  Scar mobs  Desensitization  Strengthening

## 2021-08-04 ENCOUNTER — THERAPY VISIT (OUTPATIENT)
Dept: OCCUPATIONAL THERAPY | Facility: CLINIC | Age: 81
End: 2021-08-04
Payer: COMMERCIAL

## 2021-08-04 DIAGNOSIS — M79.641 PAIN OF RIGHT HAND: ICD-10-CM

## 2021-08-04 PROCEDURE — 97140 MANUAL THERAPY 1/> REGIONS: CPT | Mod: GO | Performed by: OCCUPATIONAL THERAPIST

## 2021-08-04 PROCEDURE — 97112 NEUROMUSCULAR REEDUCATION: CPT | Mod: GO | Performed by: OCCUPATIONAL THERAPIST

## 2021-08-04 NOTE — PROGRESS NOTES
Discharge Note - Hand Therapy     Current Date:  8/4/2021  Initial Evaluation Date:  7/14/21  Reporting period is from 7/14/21 to 8/4/2021  Number of Visits:  4    Referring Physician:Kimberly Garcia PA-C    Diagnosis: Right hand mass  DOS: 5/25/21  Procedure:  Excision right hand mass, deep, 3 cm or greater  Post:  10w 1d    Yuridia Galvan is a 80 year old right hand dominant female.     Patient reports symptoms of pain, stiffness/loss of motion, weakness/loss of strength, numbness, colder,and tingling  of the right hand which occurred due to the above stated surgery.     Occupational Profile Information:  Current occupation is retired  S:  Subjective changes as noted by patient: The hand is better since starting therapy.  Functional changes noted by patient: Less pain with daily tasks    Upper Extremity Functional Index Score:  SCORE:   Column Totals: /80: 65   (A lower score indicates greater disability.)      O:  Pain Level (Scale 0-10)   7/14/2021 7/28/21 8/4/21   At Rest 0/10     With Use 1-2/10 1-2/10 1-2/10     Pain Description  Date 7/14/2021   Location  right hand   Pain Quality discomfort   Frequency intermittent     Pain is worst  daytime   Exacerbated by  gripping, pressure on scar area   Relieved by rest   Progression Gradually improving     Edema  Mild    Sensation   Decreased Median Nerve distribution per pt report and Decreased Ulnar Nerve distribution per pt report.  Patient report numbness and cold fingertip especially in digits 2-5    [place] Monofil[employer] Sensory Testing Results:  Fingers of Right Hand  7/28/21 8/4/21   1st 3.61    2nd 3.61    3rd 6.65 4.56   4th 6.65 6.65   5th 3.61    1.65-2.83=normal  3.22-3.61=diminished light touch  3.84-4.31=diminished protective sensation  4.56=loss of protective sensation  6.65=deep pressure sensation  None=Tested with no response    ROM : able to make a tight fist on (R)    Strength   Painfree (Measured in pounds)  Pain Report:  +  mild    ++ moderate    +++ severe    7/14/2021 7/14/2021 8/4/21   Trials Right Left Right   1  2  3 23  29+  25 44  44  46 35  35  38   Average 26 45 36     Lat Pinch 7/14/2021 7/14/2021   Trials Right Left   1  2  3 11 12   Average       3 Pt Pinch 7/14/2021 7/14/2021   Trials Right Left   1  2  3 9 10   Average       Palpation  Pain Report:  - none    + mild    ++ moderate    +++ severe     7/14/21 7/28/21     scar + -     Volar MCPs of 2-5 +++ ++                                      Color/Temperature:     []    Normal  [x]    Abnormal  (R) hand colder than left     Scar:  []    NA           [x]  Adherent      []   Non-adherent       []   Raised     [x]   Flattened      Scar present in   Proximal palm       Please refer to the daily flowsheet for treatment provided today.     Assessment:  Overall Assessment:  Patient's symptoms are resolving.  Patient is independent in home exercise program.  Patient's progress has slowed.  Patient is ready to be discharged from therapy and continue their home treatment program.  STG/LTG:  See goal sheet for details and updates.    Plan:  Frequency/Duration:  Discharge from Hand Therapy; continue home program.    Recommendations for Home Program -   Home Exercise Program:   Desensitization   Scar massage  Strengthening

## (undated) DEVICE — TOURNIQUET CUFF 18" REPRO RED 60-7070-103

## (undated) DEVICE — BNDG ELASTIC 3"X5YDS UNSTERILE 6611-30

## (undated) DEVICE — DRAPE STERI TOWEL LG 1010

## (undated) DEVICE — SPECIMEN CONTAINER 5OZ STERILE 2600SA

## (undated) DEVICE — IMM ALUMI HAND LG 760

## (undated) DEVICE — SOL WATER IRRIG 1000ML BOTTLE 2F7114

## (undated) DEVICE — STRAP KNEE/BODY 31143004

## (undated) DEVICE — PREP DURAPREP 26ML APL 8630

## (undated) DEVICE — CAST PADDING 4" STERILE 9044S

## (undated) DEVICE — SOL NACL 0.9% IRRIG 1000ML BOTTLE 2F7124

## (undated) DEVICE — PREP POVIDONE-IODINE 7.5% SCRUB 4OZ BOTTLE MDS093945

## (undated) DEVICE — SUCTION MANIFOLD NEPTUNE 2 SYS 4 PORT 0702-020-000

## (undated) DEVICE — LINEN ORTHO PACK 5446

## (undated) DEVICE — LINEN TOWEL PACK X5 5464

## (undated) DEVICE — SU ETHILON 3-0 PS-1 18" 1663H

## (undated) DEVICE — GLOVE PROTEXIS W/NEU-THERA 7.0  2D73TE70

## (undated) DEVICE — Device

## (undated) DEVICE — PREP SKIN SCRUB TRAY 4461A

## (undated) DEVICE — SU VICRYL 3-0 PS-1 18" UND J683G

## (undated) RX ORDER — LIDOCAINE HYDROCHLORIDE 20 MG/ML
INJECTION, SOLUTION EPIDURAL; INFILTRATION; INTRACAUDAL; PERINEURAL
Status: DISPENSED
Start: 2021-05-25

## (undated) RX ORDER — FENTANYL CITRATE 50 UG/ML
INJECTION, SOLUTION INTRAMUSCULAR; INTRAVENOUS
Status: DISPENSED
Start: 2021-05-25

## (undated) RX ORDER — ONDANSETRON 2 MG/ML
INJECTION INTRAMUSCULAR; INTRAVENOUS
Status: DISPENSED
Start: 2021-05-25

## (undated) RX ORDER — OXYCODONE HYDROCHLORIDE 5 MG/1
TABLET ORAL
Status: DISPENSED
Start: 2021-05-25

## (undated) RX ORDER — CEFAZOLIN SODIUM 2 G/100ML
INJECTION, SOLUTION INTRAVENOUS
Status: DISPENSED
Start: 2021-05-25